# Patient Record
Sex: FEMALE | Race: WHITE | Employment: FULL TIME | URBAN - METROPOLITAN AREA
[De-identification: names, ages, dates, MRNs, and addresses within clinical notes are randomized per-mention and may not be internally consistent; named-entity substitution may affect disease eponyms.]

---

## 2020-11-08 ENCOUNTER — APPOINTMENT (OUTPATIENT)
Dept: CT IMAGING | Age: 40
End: 2020-11-08
Payer: COMMERCIAL

## 2020-11-08 ENCOUNTER — APPOINTMENT (OUTPATIENT)
Dept: GENERAL RADIOLOGY | Age: 40
End: 2020-11-08
Payer: COMMERCIAL

## 2020-11-08 ENCOUNTER — HOSPITAL ENCOUNTER (EMERGENCY)
Age: 40
Discharge: HOME | End: 2020-11-08
Attending: EMERGENCY MEDICINE
Payer: COMMERCIAL

## 2020-11-08 VITALS
WEIGHT: 140 LBS | OXYGEN SATURATION: 96 % | HEART RATE: 94 BPM | DIASTOLIC BLOOD PRESSURE: 75 MMHG | TEMPERATURE: 98.4 F | RESPIRATION RATE: 22 BRPM | HEIGHT: 64 IN | BODY MASS INDEX: 23.9 KG/M2 | SYSTOLIC BLOOD PRESSURE: 117 MMHG

## 2020-11-08 LAB
ABSOLUTE EOS #: 0.24 K/UL (ref 0–0.4)
ABSOLUTE IMMATURE GRANULOCYTE: 0 K/UL (ref 0–0.3)
ABSOLUTE LYMPH #: 4.01 K/UL (ref 1–4.8)
ABSOLUTE MONO #: 1.18 K/UL (ref 0.2–0.8)
ALBUMIN SERPL-MCNC: 4.1 G/DL (ref 3.5–5.2)
ALBUMIN/GLOBULIN RATIO: ABNORMAL (ref 1–2.5)
ALP BLD-CCNC: 75 U/L (ref 35–104)
ALT SERPL-CCNC: 11 U/L (ref 5–33)
ANION GAP SERPL CALCULATED.3IONS-SCNC: 14 MMOL/L (ref 9–17)
AST SERPL-CCNC: 14 U/L
BASOPHILS # BLD: 0 %
BASOPHILS ABSOLUTE: 0 K/UL (ref 0–0.2)
BILIRUB SERPL-MCNC: 0.15 MG/DL (ref 0.3–1.2)
BNP INTERPRETATION: NORMAL
BUN BLDV-MCNC: 7 MG/DL (ref 6–20)
BUN/CREAT BLD: 12 (ref 9–20)
CALCIUM SERPL-MCNC: 8.8 MG/DL (ref 8.6–10.4)
CHLORIDE BLD-SCNC: 108 MMOL/L (ref 98–107)
CO2: 19 MMOL/L (ref 20–31)
CREAT SERPL-MCNC: 0.58 MG/DL (ref 0.5–0.9)
D-DIMER QUANTITATIVE: 0.7 MG/L FEU (ref 0–0.59)
DIFFERENTIAL TYPE: ABNORMAL
EOSINOPHILS RELATIVE PERCENT: 2 % (ref 1–4)
GFR AFRICAN AMERICAN: >60 ML/MIN
GFR NON-AFRICAN AMERICAN: >60 ML/MIN
GFR SERPL CREATININE-BSD FRML MDRD: ABNORMAL ML/MIN/{1.73_M2}
GFR SERPL CREATININE-BSD FRML MDRD: ABNORMAL ML/MIN/{1.73_M2}
GLUCOSE BLD-MCNC: 119 MG/DL (ref 70–99)
HCT VFR BLD CALC: 42.3 % (ref 36.3–47.1)
HEMOGLOBIN: 13.8 G/DL (ref 11.9–15.1)
IMMATURE GRANULOCYTES: 0 %
LYMPHOCYTES # BLD: 34 % (ref 24–44)
MCH RBC QN AUTO: 30.5 PG (ref 25.2–33.5)
MCHC RBC AUTO-ENTMCNC: 32.6 G/DL (ref 28.4–34.8)
MCV RBC AUTO: 93.4 FL (ref 82.6–102.9)
MONOCYTES # BLD: 10 % (ref 1–7)
MYOGLOBIN: <21 NG/ML (ref 25–58)
MYOGLOBIN: <21 NG/ML (ref 25–58)
NRBC AUTOMATED: 0 PER 100 WBC
PDW BLD-RTO: 13.5 % (ref 11.8–14.4)
PLATELET # BLD: 198 K/UL (ref 138–453)
PLATELET ESTIMATE: ABNORMAL
PMV BLD AUTO: 9.3 FL (ref 8.1–13.5)
POTASSIUM SERPL-SCNC: 3.4 MMOL/L (ref 3.7–5.3)
PRO-BNP: 87 PG/ML
RBC # BLD: 4.53 M/UL (ref 3.95–5.11)
RBC # BLD: ABNORMAL 10*6/UL
SEG NEUTROPHILS: 54 % (ref 36–66)
SEGMENTED NEUTROPHILS ABSOLUTE COUNT: 6.37 K/UL (ref 1.8–7.7)
SODIUM BLD-SCNC: 141 MMOL/L (ref 135–144)
TOTAL PROTEIN: 6.7 G/DL (ref 6.4–8.3)
TROPONIN INTERP: ABNORMAL
TROPONIN INTERP: ABNORMAL
TROPONIN T: ABNORMAL NG/ML
TROPONIN T: ABNORMAL NG/ML
TROPONIN, HIGH SENSITIVITY: <6 NG/L (ref 0–14)
TROPONIN, HIGH SENSITIVITY: <6 NG/L (ref 0–14)
WBC # BLD: 11.8 K/UL (ref 3.5–11.3)
WBC # BLD: ABNORMAL 10*3/UL

## 2020-11-08 PROCEDURE — 71260 CT THORAX DX C+: CPT

## 2020-11-08 PROCEDURE — 85025 COMPLETE CBC W/AUTO DIFF WBC: CPT

## 2020-11-08 PROCEDURE — 2580000003 HC RX 258: Performed by: PHYSICIAN ASSISTANT

## 2020-11-08 PROCEDURE — 96374 THER/PROPH/DIAG INJ IV PUSH: CPT

## 2020-11-08 PROCEDURE — 80053 COMPREHEN METABOLIC PANEL: CPT

## 2020-11-08 PROCEDURE — 6360000004 HC RX CONTRAST MEDICATION: Performed by: PHYSICIAN ASSISTANT

## 2020-11-08 PROCEDURE — 6370000000 HC RX 637 (ALT 250 FOR IP): Performed by: PHYSICIAN ASSISTANT

## 2020-11-08 PROCEDURE — 83880 ASSAY OF NATRIURETIC PEPTIDE: CPT

## 2020-11-08 PROCEDURE — 83874 ASSAY OF MYOGLOBIN: CPT

## 2020-11-08 PROCEDURE — 6370000000 HC RX 637 (ALT 250 FOR IP): Performed by: EMERGENCY MEDICINE

## 2020-11-08 PROCEDURE — 71045 X-RAY EXAM CHEST 1 VIEW: CPT

## 2020-11-08 PROCEDURE — 99284 EMERGENCY DEPT VISIT MOD MDM: CPT

## 2020-11-08 PROCEDURE — 84484 ASSAY OF TROPONIN QUANT: CPT

## 2020-11-08 PROCEDURE — 6360000002 HC RX W HCPCS: Performed by: EMERGENCY MEDICINE

## 2020-11-08 PROCEDURE — 85379 FIBRIN DEGRADATION QUANT: CPT

## 2020-11-08 PROCEDURE — 93005 ELECTROCARDIOGRAM TRACING: CPT | Performed by: PHYSICIAN ASSISTANT

## 2020-11-08 RX ORDER — ASPIRIN 81 MG/1
324 TABLET, CHEWABLE ORAL ONCE
Status: COMPLETED | OUTPATIENT
Start: 2020-11-08 | End: 2020-11-08

## 2020-11-08 RX ORDER — POTASSIUM CHLORIDE 20 MEQ/1
40 TABLET, EXTENDED RELEASE ORAL ONCE
Status: COMPLETED | OUTPATIENT
Start: 2020-11-08 | End: 2020-11-08

## 2020-11-08 RX ORDER — 0.9 % SODIUM CHLORIDE 0.9 %
80 INTRAVENOUS SOLUTION INTRAVENOUS ONCE
Status: COMPLETED | OUTPATIENT
Start: 2020-11-08 | End: 2020-11-08

## 2020-11-08 RX ORDER — ONDANSETRON 2 MG/ML
4 INJECTION INTRAMUSCULAR; INTRAVENOUS ONCE
Status: COMPLETED | OUTPATIENT
Start: 2020-11-08 | End: 2020-11-08

## 2020-11-08 RX ORDER — SODIUM CHLORIDE 0.9 % (FLUSH) 0.9 %
10 SYRINGE (ML) INJECTION PRN
Status: DISCONTINUED | OUTPATIENT
Start: 2020-11-08 | End: 2020-11-09 | Stop reason: HOSPADM

## 2020-11-08 RX ADMIN — POTASSIUM CHLORIDE 40 MEQ: 20 TABLET, EXTENDED RELEASE ORAL at 22:20

## 2020-11-08 RX ADMIN — ONDANSETRON 4 MG: 2 INJECTION INTRAMUSCULAR; INTRAVENOUS at 21:36

## 2020-11-08 RX ADMIN — ASPIRIN 324 MG: 81 TABLET, CHEWABLE ORAL at 19:54

## 2020-11-08 RX ADMIN — Medication 10 ML: at 21:03

## 2020-11-08 RX ADMIN — IOPAMIDOL 75 ML: 755 INJECTION, SOLUTION INTRAVENOUS at 21:03

## 2020-11-08 RX ADMIN — SODIUM CHLORIDE 80 ML: 9 INJECTION, SOLUTION INTRAVENOUS at 21:03

## 2020-11-08 ASSESSMENT — PAIN DESCRIPTION - DESCRIPTORS: DESCRIPTORS: PRESSURE;CONSTANT

## 2020-11-08 ASSESSMENT — PAIN SCALES - GENERAL: PAINLEVEL_OUTOF10: 10

## 2020-11-08 ASSESSMENT — PAIN DESCRIPTION - LOCATION: LOCATION: CHEST

## 2020-11-08 ASSESSMENT — PAIN DESCRIPTION - PAIN TYPE: TYPE: ACUTE PAIN

## 2020-11-08 ASSESSMENT — PAIN DESCRIPTION - ORIENTATION: ORIENTATION: LEFT

## 2020-11-08 NOTE — LETTER
[facility] Emergency Department      [address] [place] [address], [address] [place] [address] [phone]            PROOF OF PRESENCE      To Whom It May Concern:    *** was present in the Emergency Department at [facility] on ***.                                      Sincerely,        ***

## 2020-11-09 ENCOUNTER — OFFICE VISIT (OUTPATIENT)
Dept: FAMILY MEDICINE CLINIC | Age: 40
End: 2020-11-09
Payer: COMMERCIAL

## 2020-11-09 ENCOUNTER — HOSPITAL ENCOUNTER (OUTPATIENT)
Age: 40
Setting detail: SPECIMEN
Discharge: HOME | End: 2020-11-09
Payer: COMMERCIAL

## 2020-11-09 VITALS — BODY MASS INDEX: 22.2 KG/M2 | WEIGHT: 130 LBS | HEIGHT: 64 IN

## 2020-11-09 LAB
INFLUENZA A ANTIBODY: NORMAL
INFLUENZA B ANTIBODY: NORMAL

## 2020-11-09 PROCEDURE — 99202 OFFICE O/P NEW SF 15 MIN: CPT | Performed by: NURSE PRACTITIONER

## 2020-11-09 PROCEDURE — 87804 INFLUENZA ASSAY W/OPTIC: CPT | Performed by: NURSE PRACTITIONER

## 2020-11-09 RX ORDER — ONDANSETRON 4 MG/1
4 TABLET, FILM COATED ORAL 3 TIMES DAILY PRN
Qty: 15 TABLET | Refills: 0 | Status: SHIPPED | OUTPATIENT
Start: 2020-11-09 | End: 2021-04-20

## 2020-11-09 RX ORDER — BENZONATATE 200 MG/1
200 CAPSULE ORAL 3 TIMES DAILY PRN
Qty: 30 CAPSULE | Refills: 0 | Status: SHIPPED | OUTPATIENT
Start: 2020-11-09 | End: 2020-11-16

## 2020-11-09 ASSESSMENT — ENCOUNTER SYMPTOMS
COUGH: 1
VOMITING: 1
SHORTNESS OF BREATH: 0
ABDOMINAL PAIN: 0
SINUS PAIN: 0
DIARRHEA: 0
SORE THROAT: 1
NAUSEA: 1

## 2020-11-09 NOTE — PATIENT INSTRUCTIONS
Advance Care Planning  People with COVID-19 may have no symptoms, mild symptoms, such as fever, cough, and shortness of breath or they may have more severe illness, developing severe and fatal pneumonia. As a result, Advance Care Planning with attention to naming a health care decision maker (someone you trust to make healthcare decisions for you if you could not speak for yourself) and sharing other health care preferences is important BEFORE a possible health crisis. Please contact your Primary Care Provider to discuss Advance Care Planning. Preventing the Spread of Coronavirus Disease 2019 in Homes and Residential Communities  For the most recent information go to [email or web address]    Prevention steps for People with confirmed or suspected COVID-19 (including persons under investigation) who do not need to be hospitalized  and   People with confirmed COVID-19 who were hospitalized and determined to be medically stable to go home    Your healthcare provider and public health staff will evaluate whether you can be cared for at home. If it is determined that you do not need to be hospitalized and can be isolated at home, you will be monitored by staff from your local or state health department. You should follow the prevention steps below until a healthcare provider or local or state health department says you can return to your normal activities. Stay home except to get medical care  People who are mildly ill with COVID-19 are able to isolate at home during their illness. You should restrict activities outside your home, except for getting medical care. Do not go to work, school, or public areas. Avoid using public transportation, ride-sharing, or taxis. Separate yourself from other people and animals in your home  People: As much as possible, you should stay in a specific room and away from other people in your home.  Also, you should use a separate before eating or preparing food. If soap and water are not readily available, use an alcohol-based hand  with at least 60% alcohol, covering all surfaces of your hands and rubbing them together until they feel dry. Soap and water are the best option if hands are visibly dirty. Avoid touching your eyes, nose, and mouth with unwashed hands. Avoid sharing personal household items  You should not share dishes, drinking glasses, cups, eating utensils, towels, or bedding with other people or pets in your home. After using these items, they should be washed thoroughly with soap and water. Clean all high-touch surfaces everyday  High touch surfaces include counters, tabletops, doorknobs, bathroom fixtures, toilets, phones, keyboards, tablets, and bedside tables. Also, clean any surfaces that may have blood, stool, or body fluids on them. Use a household cleaning spray or wipe, according to the label instructions. Labels contain instructions for safe and effective use of the cleaning product including precautions you should take when applying the product, such as wearing gloves and making sure you have good ventilation during use of the product. Monitor your symptoms  Seek prompt medical attention if your illness is worsening (e.g., difficulty breathing). Before seeking care, call your healthcare provider and tell them that you have, or are being evaluated for, COVID-19. Put on a facemask before you enter the facility. These steps will help the healthcare providers office to keep other people in the office or waiting room from getting infected or exposed. Ask your healthcare provider to call the local or state health department. Persons who are placed under active monitoring or facilitated self-monitoring should follow instructions provided by their local health department or occupational health professionals, as appropriate. When working with your local health department check their available hours.   If you have a medical emergency and need to call 911, notify the dispatch personnel that you have, or are being evaluated for COVID-19. If possible, put on a facemask before emergency medical services arrive. Discontinuing home isolation  Patients with confirmed COVID-19 should remain under home isolation precautions until the risk of secondary transmission to others is thought to be low. The decision to discontinue home isolation precautions should be made on a case-by-case basis, in consultation with healthcare providers and state and local health departments.

## 2020-11-09 NOTE — ED PROVIDER NOTES
[facility] ED  [facility]      Pt Name: Garrett Wetzel  MRN: [id number]  Robbygfbenjamin 1980  Date ofevaluation: 11/8/2020  Provider: Buddy Gabriel Dr       Chief Complaint   Patient presents with    Chest Pain     Onset 30 minutes pta    Shortness of Breath     Ongoing for 1 week         HISTORY OF PRESENT ILLNESS  (Location/Symptom, Timing/Onset, Context/Setting, Quality, Duration, Modifying Factors, Severity.)   Garrett Wetzel is a 36 y.o. female who presents to the emergency department with chest pain off and on for the last week. No fevers or chills. No nausea vomiting. No definite alleviating aggravating factors. No other complaints. Nursing Notes were reviewed. ALLERGIES     Patient has no known allergies. CURRENT MEDICATIONS       Discharge Medication List as of 11/8/2020 10:25 PM      CONTINUE these medications which have NOT CHANGED    Details   !! methylPREDNISolone (MEDROL DOSEPACK) 4 MG tablet Take by mouth as directed., Disp-21 tablet, R-0      DULoxetine (CYMBALTA) 30 MG capsule Take 1 capsule by mouth daily. , Disp-30 capsule, R-3      !! methylPREDNISolone (MEDROL DOSEPACK) 4 MG tablet Take by mouth as directed., Disp-21 tablet, R-0      etodolac (LODINE) 500 MG tablet Take 1 tablet by mouth 2 times daily. , Disp-60 tablet, R-2       !! - Potential duplicate medications found. Please discuss with provider. PAST MEDICAL HISTORY   History reviewed. No pertinent past medical history. SURGICAL HISTORY           Procedure Laterality Date    HAND TENDON SURGERY      HERNIA REPAIR      KIDNEY STONE SURGERY           FAMILY HISTORY     History reviewed. No pertinent family history. No family status information on file. SOCIAL HISTORY      reports that she has been smoking. She has been smoking about 0.50 packs per day. She has never used smokeless tobacco. She reports previous alcohol use.  She reports previous drug use.    REVIEW OFSYSTEMS    (2-9 systems for level 4, 10 or more for level 5)   Review of Systems    Except as noted above the remainder of the review of systems was reviewed and negative. PHYSICAL EXAM    (up to 7 for level 4, 8 or more for level 5)     ED Triage Vitals [11/08/20 1946]   BP Temp Temp Source Pulse Resp SpO2 Height Weight   121/76 98.4 °F (36.9 °C) Oral 119 18 96 % 5' 4\" (1.626 m) 140 lb (63.5 kg)      Physical Exam  Constitutional:       Appearance: She is well-developed. HENT:      Head: Normocephalic and atraumatic. Neck:      Musculoskeletal: Normal range of motion and neck supple. Cardiovascular:      Rate and Rhythm: Normal rate and regular rhythm. Pulmonary:      Effort: Pulmonary effort is normal.      Breath sounds: Normal breath sounds. Abdominal:      Palpations: Abdomen is soft. Musculoskeletal: Normal range of motion. Skin:     General: Skin is warm. Findings: No rash. Neurological:      Mental Status: She is alert and oriented to person, place, and time.    Psychiatric:         Behavior: Behavior normal.                 DIAGNOSTIC RESULTS     EKG: All EKG's are interpreted by the Emergency Department Physician who either signs or Co-signs this chart in the absence of a cardiologist.        RADIOLOGY:   Non-plain film images such as CT, Ultrasound and MRI are read by the radiologist. Plain radiographic images arevisualized and preliminarily interpreted by the emergency physician with the below findings:        Interpretation per the Radiologist below, if available at thetime of this note:          ED BEDSIDE ULTRASOUND:   Performed by ED Physician - none    LABS:  Labs Reviewed   CBC WITH AUTO DIFFERENTIAL - Abnormal; Notable for the following components:       Result Value    WBC 11.8 (*)     Monocytes 10 (*)     Absolute Mono # 1.18 (*)     All other components within normal limits   TROP/MYOGLOBIN - Abnormal; Notable for the following components:    Myoglobin <21 (*)     All other components within normal limits   TROP/MYOGLOBIN - Abnormal; Notable for the following components:    Myoglobin <21 (*)     All other components within normal limits   COMPREHENSIVE METABOLIC PANEL - Abnormal; Notable for the following components:    Glucose 119 (*)     Potassium 3.4 (*)     Chloride 108 (*)     CO2 19 (*)     Total Bilirubin 0.15 (*)     All other components within normal limits   D-DIMER, QUANTITATIVE - Abnormal; Notable for the following components:    D-Dimer, Quant 0.70 (*)     All other components within normal limits   BRAIN NATRIURETIC PEPTIDE   TROP/MYOGLOBIN       All other labs were within normal range or not returned as of this dictation. EMERGENCY DEPARTMENT COURSE and DIFFERENTIAL DIAGNOSIS/MDM:   Vitals:    Vitals:    11/08/20 2136 11/08/20 2150 11/08/20 2207 11/08/20 2220   BP: 108/73 108/81 92/62 117/75   Pulse: 105 95 84 94   Resp: 19  16 22   Temp:       TempSrc:       SpO2: 90% 95% 93% 96%   Weight:       Height:       D-dimer is elevated so therefore CT scan of chest was done to rule out PE.  2 sets of cardiac enzymes were negative. Patient will be discharged home. CONSULTS:  None    PROCEDURES:  Procedures        FINAL IMPRESSION      1. Chest pain, unspecified type          DISPOSITION/PLAN   DISPOSITION Decision To Discharge 11/08/2020 10:10:36 PM      PATIENTREFERRED TO:   No follow-up provider specified.     DISCHARGE MEDICATIONS:     Discharge Medication List as of 11/8/2020 10:25 PM              (Please note that portions of this note were completed with a voice recognition program.  Efforts were made to edit thedictations but occasionally words are mis-transcribed.)    ISMAEL Lopez PA-C  11/08/20 [id number]

## 2020-11-09 NOTE — LETTER
[address] [place] [address]  [place] [address]  Phone: [phone]  Fax: [phone]    KHLOE Toro CNP        November 9, 2020     Patient: Ayse Camacho   YOB: 1980   Date of Visit: 11/9/2020       To Whom It May Concern: It is my medical opinion that Ayse Camacho Is excused pending COVID results. If you have any questions or concerns, please don't hesitate to call.     Sincerely,        KHLOE Toro CNP

## 2020-11-10 LAB
EKG ATRIAL RATE: 101 BPM
EKG P AXIS: 65 DEGREES
EKG P-R INTERVAL: 144 MS
EKG Q-T INTERVAL: 342 MS
EKG QRS DURATION: 92 MS
EKG QTC CALCULATION (BAZETT): 443 MS
EKG R AXIS: 69 DEGREES
EKG T AXIS: 57 DEGREES
EKG VENTRICULAR RATE: 101 BPM
SARS-COV-2, NAA: NOT DETECTED

## 2020-11-10 NOTE — PROGRESS NOTES
for this visit. No Known Allergies    Subjective:      Review of Systems   Constitutional: Negative for chills and fever. HENT: Positive for congestion and sore throat. Negative for ear pain and sinus pain. Respiratory: Positive for cough. Negative for shortness of breath. Cardiovascular: Positive for chest pain. Negative for palpitations. Gastrointestinal: Positive for nausea and vomiting. Negative for abdominal pain and diarrhea. Musculoskeletal: Positive for myalgias. Neurological: Positive for headaches. Negative for dizziness. All other systems reviewed and are negative. Objective:     Physical Exam  Vitals signs and nursing note reviewed. Constitutional:       General: She is not in acute distress. Appearance: Normal appearance. HENT:      Nose: Congestion present. Mouth/Throat:      Mouth: Mucous membranes are moist.      Pharynx: No posterior oropharyngeal erythema. Cardiovascular:      Rate and Rhythm: Normal rate. Pulmonary:      Effort: Pulmonary effort is normal. No respiratory distress. Breath sounds: Normal breath sounds. Skin:     General: Skin is warm and dry. Neurological:      General: No focal deficit present. Mental Status: She is alert and oriented to person, place, and time.        Ht 5' 4\" (1.626 m)   Wt 130 lb (59 kg)   BMI 22.31 kg/m²   Lab Review   Admission on 11/08/2020, Discharged on 11/08/2020   Component Date Value    WBC 11/08/2020 11.8*    RBC 11/08/2020 4.53     Hemoglobin 11/08/2020 13.8     Hematocrit 11/08/2020 42.3     MCV 11/08/2020 93.4     MCH 11/08/2020 30.5     MCHC 11/08/2020 32.6     RDW 11/08/2020 13.5     Platelets 73/60/1979 198     MPV 11/08/2020 9.3     NRBC Automated 11/08/2020 0.0     Differential Type 11/08/2020 NOT REPORTED     WBC Morphology 11/08/2020 NOT REPORTED     RBC Morphology 11/08/2020 NOT REPORTED     Platelet Estimate 97/02/8094 NOT REPORTED     Seg Neutrophils 11/08/2020 54  Lymphocytes 11/08/2020 34     Monocytes 11/08/2020 10*    Eosinophils % 11/08/2020 2     Basophils 11/08/2020 0     Immature Granulocytes 11/08/2020 0     Segs Absolute 11/08/2020 6.37     Absolute Lymph # 11/08/2020 4.01     Absolute Mono # 11/08/2020 1.18*    Absolute Eos # 11/08/2020 0.24     Basophils Absolute 11/08/2020 0.00     Absolute Immature Granul* 11/08/2020 0.00     Ventricular Rate 11/08/2020 101     Atrial Rate 11/08/2020 101     P-R Interval 11/08/2020 144     QRS Duration 11/08/2020 92     Q-T Interval 11/08/2020 342     QTc Calculation (Bazett) 11/08/2020 443     P Axis 11/08/2020 65     R Axis 11/08/2020 69     T Axis 11/08/2020 57     Troponin, High Sensitivi* 11/08/2020 <6     Troponin T 11/08/2020 NOT REPORTED     Troponin Interp 11/08/2020 NOT REPORTED     Myoglobin 11/08/2020 <21*    Troponin, High Sensitivi* 11/08/2020 <6     Troponin T 11/08/2020 NOT REPORTED     Troponin Interp 11/08/2020 NOT REPORTED     Myoglobin 11/08/2020 <21*    Pro-BNP 11/08/2020 87     BNP Interpretation 11/08/2020 Pro-BNP Reference Range:     Glucose 11/08/2020 119*    BUN 11/08/2020 7     CREATININE 11/08/2020 0.58     Bun/Cre Ratio 11/08/2020 12     Calcium 11/08/2020 8.8     Sodium 11/08/2020 141     Potassium 11/08/2020 3.4*    Chloride 11/08/2020 108*    CO2 11/08/2020 19*    Anion Gap 11/08/2020 14     Alkaline Phosphatase 11/08/2020 75     ALT 11/08/2020 11     AST 11/08/2020 14     Total Bilirubin 11/08/2020 0.15*    Total Protein 11/08/2020 6.7     Alb 11/08/2020 4.1     Albumin/Globulin Ratio 11/08/2020 NOT REPORTED     GFR Non- 11/08/2020 >60     GFR  11/08/2020 >60     GFR Comment 11/08/2020          GFR Staging 11/08/2020 NOT REPORTED     D-Dimer, Quant 11/08/2020 0.70*       Assessment:       Diagnosis Orders   1.  Nausea and vomiting, intractability of vomiting not specified, unspecified vomiting type  benzonatate (TESSALON) 200 MG capsule    ondansetron (ZOFRAN) 4 MG tablet    POCT Influenza A/B    COVID-19 Ambulatory   2. Cough  benzonatate (TESSALON) 200 MG capsule    ondansetron (ZOFRAN) 4 MG tablet    POCT Influenza A/B    COVID-19 Ambulatory       Plan:      Return if symptoms worsen or fail to improve. Orders Placed This Encounter   Medications    benzonatate (TESSALON) 200 MG capsule     Sig: Take 1 capsule by mouth 3 times daily as needed for Cough     Dispense:  30 capsule     Refill:  0    ondansetron (ZOFRAN) 4 MG tablet     Sig: Take 1 tablet by mouth 3 times daily as needed for Nausea or Vomiting     Dispense:  15 tablet     Refill:  0       Results for orders placed or performed in visit on 11/09/20   POCT Influenza A/B   Result Value Ref Range    Influenza A Ab NEG     Influenza B Ab NEG      Recommend isolation pending covid results. Discussed treatment regimen to include rest, hydration, tylenol prn. Discussed deep breathing exercises. Discussed to monitor for progression of symptoms. Follow up as needed. Will contact patient for results       Patient given educational materials - see patient instructions. Discussed use, benefit, and side effects of prescribed medications. All patientquestions answered. Pt voiced understanding. This note was transcribed using dictation with Dragon services. Efforts were made to correct any errors but some words may be misinterpreted.      Electronically signed by KHLOE Nixon CNP on 11/9/2020at 10:14 PM

## 2021-04-20 ENCOUNTER — OFFICE VISIT (OUTPATIENT)
Dept: ORTHOPEDIC SURGERY | Age: 41
End: 2021-04-20
Payer: COMMERCIAL

## 2021-04-20 VITALS
BODY MASS INDEX: 22.2 KG/M2 | HEIGHT: 64 IN | WEIGHT: 130 LBS | HEART RATE: 85 BPM | DIASTOLIC BLOOD PRESSURE: 79 MMHG | SYSTOLIC BLOOD PRESSURE: 116 MMHG

## 2021-04-20 DIAGNOSIS — M75.101 ROTATOR CUFF SYNDROME, RIGHT: Primary | ICD-10-CM

## 2021-04-20 DIAGNOSIS — M77.11 RIGHT LATERAL EPICONDYLITIS: ICD-10-CM

## 2021-04-20 PROCEDURE — 99204 OFFICE O/P NEW MOD 45 MIN: CPT | Performed by: FAMILY MEDICINE

## 2021-04-20 NOTE — PROGRESS NOTES
Sports Medicine Consultation     CHIEF COMPLAINT:  Shoulder Pain (Rt shoulder. almost 1yr. no injury) and Elbow Injury (Rt elbow. almost 1yr no injury)      HPI:  Melissa Peña is a 39y.o. year old female who is a new patient being seen for regarding new problem right elbow pain. The pain has been present for 1 year(s). The patient recalls a no specific injury. The patient has tried counterforce strap with improvement. The pain is described as achy. The elbow does not swell. There is is not painful popping and clicking. The elbow does not catch or lock. new problem right. The patient is a right hand dominant female who has had shoulder pain for years. As far as trauma to the shoulder, the patient indicates none, pain came up from elbow. The pain is  worse at night and when doing overhead activities. Weakness of the shoulder has  been noted. The pain restricts activities such as anything that might be heavy. The pain does not seem to improve with time. The following medications have been tried: motrin with benefit. Physical Therapy has not been tried. Corticosteroid injection has not been done. Neck pain has not been present. she has no past medical history on file. she has a past surgical history that includes Kidney stone surgery; Hand tendon surgery; and hernia repair. family history is not on file.     Social History     Socioeconomic History    Marital status: Legally      Spouse name: Not on file    Number of children: Not on file    Years of education: Not on file    Highest education level: Not on file   Occupational History    Not on file   Social Needs    Financial resource strain: Not on file    Food insecurity     Worry: Not on file     Inability: Not on file    Transportation needs     Medical: Not on file     Non-medical: Not on file   Tobacco Use    Smoking status: Current Every Day Smoker     Packs/day: 0.50    Smokeless tobacco: Never Used Substance and Sexual Activity    Alcohol use: Not Currently    Drug use: Not Currently    Sexual activity: Not on file   Lifestyle    Physical activity     Days per week: Not on file     Minutes per session: Not on file    Stress: Not on file   Relationships    Social connections     Talks on phone: Not on file     Gets together: Not on file     Attends [employer] service: Not on file     Active member of club or organization: Not on file     Attends meetings of clubs or organizations: Not on file     Relationship status: Not on file    Intimate partner violence     Fear of current or ex partner: Not on file     Emotionally abused: Not on file     Physically abused: Not on file     Forced sexual activity: Not on file   Other Topics Concern    Not on file   Social History Narrative    Not on file       Current Outpatient Medications   Medication Sig Dispense Refill    diclofenac sodium (VOLTAREN) 1 % GEL Apply 2 g topically 4 times daily as needed for Pain 100 g 3    methylPREDNISolone (MEDROL DOSEPACK) 4 MG tablet Take by mouth as directed. (Patient not taking: Reported on 11/9/2020) 21 tablet 0     No current facility-administered medications for this visit. Allergies:  shehas No Known Allergies. ROS:  CV:  Denies chest pain; palpitations; shortness of breath; swelling of feet, ankles; and loss of consciousness. CON: Denies fever and dizziness. ENT:  Denies hearing loss / ringing, ear infections hoarseness, and swallowing problems. RESP:  Denies chronic cough, spitting up blood, and asthma/wheezing. GI: Denies abdominal pain, change in bowel habits, nausea or vomiting, and blood in stools. :  Denies frequent urination, burning or painful urination, blood in the urine, and bladder incontinence. NEURO:  Denies headache, memory loss, sleep disturbance, and tremor or movement disorder.     PHYSICAL EXAM:   /79 (Site: Left Upper Arm)   Pulse 85   Ht 5' 4\" (1.626 m)   Wt 130 lb (59 kg)   BMI 22.31 kg/m²   GENERAL: Jose Lal is a 39 y.o. female who is alert and oriented and sitting comfortably in our office. SKIN:  Intact without rashes, lesions or ulcerations. NEURO: Sensation to the extremity is intact. VASC:  Capillary refill is less than 3 seconds. Distal pulses are palpable. There is no lymphadenopathy. Cervical spine ROM WNL  Spurlings: negative,     MSK:  Forward elevation 180 degrees, external rotation in neutral 90 degrees, abduction 180 degrees, internal rotation to T6. Supraspinatus 5/5   External rotators 5/5  Internal 5/5  Full Can negative   Empty Can negative   Neer's test positive   Rodriguez-Mundo test. negative. Pain with cross body adduction negative. Anterior Labral Stress test positive. Speed's test negative   Trenton's test negative. Pain over anterolateral acromion negative. Subscap liftoff negative. Belly press test negative. Pain over AC joint negative. Pain over traps/rhomboids negative. Elbow Exam:  Musculoskeletal/Neurologic:  Inspection-Deformity: no  Palpation-Tenderness: lat epicondyle  Elbow ROM-WNL  Pain with passive wrist flexion: no  Pain with passive wrist extension: yes  Pain with active wrist flexion: no  Pain with active wrist extension: yes  Strength- WNL  Sensation-normal to light touch in median, ulnar, and radial distribution  Special Tests-No varus/valgus laxity  Milking Maneuver negative  Franklin-Dionicio negative    Tinel's at cubital tunnel:negative    Sensation-normal to light touch    PSYCH:  Good fund of knowledge and displays understanding of exam.    RADIOLOGY: No results found. Radiology:  3 views of the Right shoulder were ordered, independently visualized by me, and discussed with patient. Findings:  The radiographs failed to demonstrate significant osseous abnormalities or degenerative changes of the acromioclavicular joint and a mild nature without any acute fractures or dislocations    Assessment no acute processes of the right shoulder    IMPRESSION:     1. Rotator cuff syndrome, right    2. Right lateral epicondylitis          PLAN:   We discussed some of the etiologies and natural histories of     ICD-10-CM    1. Rotator cuff syndrome, right  M75. 101 XR SHOULDER RIGHT (MIN 2 VIEWS)     External Referral To Physical Therapy   2. Right lateral epicondylitis  M77.11 External Referral To Physical Therapy   . We discussed the various treatment alternatives including anti-inflammatory medications, physical therapy, injections, further imaging studies and as a last resort surgery. Think the patient would benefit from a course of formal physical therapy working on her elbow and shoulder we will set her up for the course of physical therapy and see her back otherwise as needed if she fails to improve we will consider further interventions however I think therapy is the right answer for the patient she voiced understanding agreement satisfaction with this plan    Return to clinic in No follow-ups on file. Marcellus Rios     Please be aware portions of this note were completed using voice recognition software and unforeseen errors may have occurred    Electronically signed by Zohra Azul DO, FAOASM on 4/20/21 at 1:54 PM EDT

## 2021-04-22 DIAGNOSIS — M75.101 ROTATOR CUFF SYNDROME, RIGHT: ICD-10-CM

## 2021-04-22 DIAGNOSIS — M77.11 RIGHT LATERAL EPICONDYLITIS: Primary | ICD-10-CM

## 2021-05-17 ENCOUNTER — OFFICE VISIT (OUTPATIENT)
Dept: ORTHOPEDIC SURGERY | Age: 41
End: 2021-05-17
Payer: COMMERCIAL

## 2021-05-17 VITALS — HEIGHT: 64 IN | BODY MASS INDEX: 22.2 KG/M2 | WEIGHT: 130 LBS

## 2021-05-17 DIAGNOSIS — M77.11 RIGHT LATERAL EPICONDYLITIS: ICD-10-CM

## 2021-05-17 DIAGNOSIS — M75.101 ROTATOR CUFF SYNDROME, RIGHT: Primary | ICD-10-CM

## 2021-05-17 PROCEDURE — 99214 OFFICE O/P EST MOD 30 MIN: CPT | Performed by: FAMILY MEDICINE

## 2021-05-17 RX ORDER — METHYLPREDNISOLONE 4 MG/1
TABLET ORAL
Qty: 1 KIT | Refills: 0 | Status: SHIPPED | OUTPATIENT
Start: 2021-05-17 | End: 2021-05-23

## 2021-05-17 NOTE — PROGRESS NOTES
Sports Medicine Consultation    CHIEF COMPLAINT:  Elbow Pain (Right ) and Shoulder Pain (Right )        HPI:   The patient is a 39 y.o. female who is being seen as a  established patient being seen for regarding follow up of a pre-existing problem r shoulder and elbow. The patient is a right hand dominant female who has had shoulder pain for years. As far as trauma to the shoulder, the patient indicates no new. The pain is  worse at night and when doing overhead activities. Weakness of the shoulder has  been noted. The pain restricts activities such as all. The pain does not seem to improve with time. The following medications have been tried: PT with benefit. Physical Therapy has been tried. Corticosteroid injection has not been done. Neck pain has been present. she has no past medical history on file. she has a past surgical history that includes Kidney stone surgery; Hand tendon surgery; and hernia repair. History reviewed. No pertinent past medical history. Past Surgical History:   Procedure Laterality Date    HAND TENDON SURGERY      HERNIA REPAIR      KIDNEY STONE SURGERY         family history is not on file.     Social History     Socioeconomic History    Marital status: Legally      Spouse name: Not on file    Number of children: Not on file    Years of education: Not on file    Highest education level: Not on file   Occupational History    Not on file   Tobacco Use    Smoking status: Current Every Day Smoker     Packs/day: 0.50    Smokeless tobacco: Never Used   Vaping Use    Vaping Use: Never used   Substance and Sexual Activity    Alcohol use: Not Currently    Drug use: Not Currently    Sexual activity: Not on file   Other Topics Concern    Not on file   Social History Narrative    Not on file     Social Determinants of Health     Financial Resource Strain:     Difficulty of Paying Living Expenses:    Food Insecurity:     Worried About Running Out of Food in the Last Year:    [address] in the Last Year:    Transportation Needs:     Lack of Transportation (Medical):  Lack of Transportation (Non-Medical):    Physical Activity:     Days of Exercise per Week:     Minutes of Exercise per Session:    Stress:     Feeling of Stress :    Social Connections:     Frequency of Communication with Friends and Family:     Frequency of Social Gatherings with Friends and Family:     Attends Jew Services:     Active Member of Clubs or Organizations:     Attends Club or Organization Meetings:     Marital Status:    Intimate Partner Violence:     Fear of Current or Ex-Partner:     Emotionally Abused:     Physically Abused:     Sexually Abused:        Current Outpatient Medications   Medication Sig Dispense Refill    methylPREDNISolone (MEDROL DOSEPACK) 4 MG tablet Take by mouth. 1 kit 0    diclofenac sodium (VOLTAREN) 1 % GEL Apply 2 g topically 4 times daily as needed for Pain 100 g 3    methylPREDNISolone (MEDROL DOSEPACK) 4 MG tablet Take by mouth as directed. 21 tablet 0     No current facility-administered medications for this visit. Allergies:  shehas No Known Allergies. ROS:  CV:  Denies chest pain; palpitations; shortness of breath; swelling of feet, ankles; and loss of consciousness. CON: Denies fever and dizziness. ENT:  Denies hearing loss / ringing, ear infections hoarseness, and swallowing problems. RESP:  Denies chronic cough, spitting up blood, and asthma/wheezing. GI: Denies abdominal pain, change in bowel habits, nausea or vomiting, and blood in stools. :  Denies frequent urination, burning or painful urination, blood in the urine, and bladder incontinence. NEURO:  Denies headache, memory loss, sleep disturbance, and tremor or movement disorder.     PHYSICAL EXAM:   Ht 5' 4\" (1.626 m)   Wt 130 lb (59 kg)   BMI 22.31 kg/m²   GENERAL: Amy Guardado is a 39 y.o. female who is alert and oriented and sitting comfortably in our office. SKIN:  Intact without rashes, lesions or ulcerations. No obvious deformity or swelling. NEURO: Musculoskeletal and axillary nerves intact to sensory and motor testing. EYES:  Extraocular muscles intact. MOUTH: Oral mucosa moist.  No perioral lesions. PULM:  Respirations unlabored and regular. VASC:  Capillary refill less than 3 seconds. Cervical spine ROM Reduced resulting in globally  Spurlings: negative,     MSK:  Forward elevation 160 degrees, external rotation in neutral 80 degrees, abduction 160 degrees, internal rotation to T12. Supraspinatus 5/5   External rotators 5/5  Internal 5/5  Full Can negative   Empty Can negative   Neer's test positive   Rodriguez-Mundo test. positive. Pain with cross body adduction negative. Anterior Labral Stress test positive. Speed's test negative   [place]'s test negative. Pain over anterolateral acromion negative. Subscap liftoff positive. Belly press test negative. Pain over AC joint negative. Pain over traps/rhomboids positive. Elbow Exam:  Musculoskeletal/Neurologic:  Inspection-Deformity: no  Palpation-Tenderness: condyles  Elbow ROM-WNL  Pain with passive wrist flexion: no  Pain with passive wrist extension: no  Pain with active wrist flexion: no  Pain with active wrist extension: no  Strength- WNL  Sensation-normal to light touch in median, ulnar, and radial distribution  Special Tests-No varus/valgus laxity  Milking Maneuver negative  Franklin-Haw negative    Tinel's at cubital tunnel:negative    PSYCH:  Patient has good fund of knowledge and displays understanding of exam.    RADIOLOGY: No results found. IMPRESSION:     1. Rotator cuff syndrome, right    2. Right lateral epicondylitis        PLAN:   We discussed some of the etiologies and natural histories of     ICD-10-CM    1. Rotator cuff syndrome, right  M75.101    2.  Right lateral epicondylitis  M77.11      We discussed the various treatment alternatives including anti-inflammatory medications, physical therapy, injections, further imaging studies and as a last resort surgery. At this point patient reports that she is in pain but she has not done anything from a pain relief standpoint she was unable to get anti-inflammatories and is currently doing formal physical therapy we will make sure that she picks up the topical anti-inflammatories we will give her Medrol Dosepak we will see her back based on the progression she will call me    Return to clinic No follow-ups on file. Negar Hicks     Please be aware portions of this note were completed using voice recognition software and unforeseen errors may have occurred    Electronically signed by Morenita Ambrosio DO, FAOASM on 5/17/21 at 10:04 AM EDT Non-Graft Cartilage Fenestration Text: The cartilage was fenestrated with a 2mm punch biopsy to help facilitate healing.

## 2021-06-02 ENCOUNTER — OFFICE VISIT (OUTPATIENT)
Dept: FAMILY MEDICINE CLINIC | Age: 41
End: 2021-06-02
Payer: COMMERCIAL

## 2021-06-02 VITALS
WEIGHT: 142.8 LBS | HEART RATE: 85 BPM | DIASTOLIC BLOOD PRESSURE: 60 MMHG | OXYGEN SATURATION: 98 % | HEIGHT: 64 IN | TEMPERATURE: 97.9 F | SYSTOLIC BLOOD PRESSURE: 102 MMHG | BODY MASS INDEX: 24.38 KG/M2

## 2021-06-02 DIAGNOSIS — F17.200 TOBACCO DEPENDENCE: ICD-10-CM

## 2021-06-02 DIAGNOSIS — Z00.00 ROUTINE GENERAL MEDICAL EXAMINATION AT A HEALTH CARE FACILITY: ICD-10-CM

## 2021-06-02 DIAGNOSIS — Z13.220 SCREENING, LIPID: ICD-10-CM

## 2021-06-02 DIAGNOSIS — Z76.89 ESTABLISHING CARE WITH NEW DOCTOR, ENCOUNTER FOR: ICD-10-CM

## 2021-06-02 DIAGNOSIS — Z13.1 DIABETES MELLITUS SCREENING: ICD-10-CM

## 2021-06-02 DIAGNOSIS — Z11.59 ENCOUNTER FOR HEPATITIS C SCREENING TEST FOR LOW RISK PATIENT: ICD-10-CM

## 2021-06-02 DIAGNOSIS — M19.011 ARTHROPATHY OF RIGHT SHOULDER: ICD-10-CM

## 2021-06-02 DIAGNOSIS — Z23 NEED FOR PNEUMOCOCCAL VACCINATION: ICD-10-CM

## 2021-06-02 DIAGNOSIS — J01.00 ACUTE NON-RECURRENT MAXILLARY SINUSITIS: Primary | ICD-10-CM

## 2021-06-02 DIAGNOSIS — Z11.4 SCREENING FOR HIV (HUMAN IMMUNODEFICIENCY VIRUS): ICD-10-CM

## 2021-06-02 PROCEDURE — 99406 BEHAV CHNG SMOKING 3-10 MIN: CPT | Performed by: NURSE PRACTITIONER

## 2021-06-02 PROCEDURE — 90471 IMMUNIZATION ADMIN: CPT | Performed by: NURSE PRACTITIONER

## 2021-06-02 PROCEDURE — 99203 OFFICE O/P NEW LOW 30 MIN: CPT | Performed by: NURSE PRACTITIONER

## 2021-06-02 PROCEDURE — 90732 PPSV23 VACC 2 YRS+ SUBQ/IM: CPT | Performed by: NURSE PRACTITIONER

## 2021-06-02 RX ORDER — VARENICLINE TARTRATE
KIT
Qty: 1 BOX | Refills: 0 | Status: ON HOLD | OUTPATIENT
Start: 2021-06-02 | End: 2022-06-23 | Stop reason: ALTCHOICE

## 2021-06-02 RX ORDER — AMOXICILLIN 875 MG/1
875 TABLET, COATED ORAL 2 TIMES DAILY
Qty: 20 TABLET | Refills: 0 | Status: SHIPPED | OUTPATIENT
Start: 2021-06-02 | End: 2021-06-12

## 2021-06-02 RX ORDER — VARENICLINE TARTRATE 1 MG/1
1 TABLET, FILM COATED ORAL 2 TIMES DAILY
Qty: 60 TABLET | Refills: 5 | Status: ON HOLD | OUTPATIENT
Start: 2021-06-02 | End: 2022-06-23 | Stop reason: ALTCHOICE

## 2021-06-02 ASSESSMENT — ENCOUNTER SYMPTOMS
SORE THROAT: 1
ABDOMINAL PAIN: 0
WHEEZING: 0
RHINORRHEA: 1
SINUS PAIN: 1
VOMITING: 0
NAUSEA: 0
COUGH: 1
SHORTNESS OF BREATH: 0
CHEST TIGHTNESS: 0
SINUS PRESSURE: 1

## 2021-06-02 ASSESSMENT — PATIENT HEALTH QUESTIONNAIRE - PHQ9
SUM OF ALL RESPONSES TO PHQ QUESTIONS 1-9: 0
2. FEELING DOWN, DEPRESSED OR HOPELESS: 0
SUM OF ALL RESPONSES TO PHQ9 QUESTIONS 1 & 2: 0
1. LITTLE INTEREST OR PLEASURE IN DOING THINGS: 0
SUM OF ALL RESPONSES TO PHQ QUESTIONS 1-9: 0
SUM OF ALL RESPONSES TO PHQ QUESTIONS 1-9: 0

## 2021-06-02 NOTE — PROGRESS NOTES
Visit Information    Have you changed or started any medications since your last visit including any over-the-counter medicines, vitamins, or herbal medicines? no   Have you stopped taking any of your medications? Is so, why? -  no  Are you having any side effects from any of your medications? - no    Have you seen any other physician or provider since your last visit?  no   Have you had any other diagnostic tests since your last visit?  no   Have you been seen in the emergency room and/or had an admission in a hospital since we last saw you?  no   Have you had your routine dental cleaning in the past 6 months?  no     Do you have an active MyChart account? If no, what is the barrier? No: na    Patient Care Team:  KHLOE Valencia CNP as PCP - General (Family Nurse Practitioner)    Medical History Review  Past Medical, Family, and Social History reviewed and  contribute to the patient presenting condition    Health Maintenance   Topic Date Due    Hepatitis C screen  Never done    Varicella vaccine (1 of 2 - 2-dose childhood series) Never done    Pneumococcal 0-64 years Vaccine (1 of 2 - PPSV23) Never done    COVID-19 Vaccine (1) Never done    HIV screen  Never done    DTaP/Tdap/Td vaccine (1 - Tdap) Never done    Cervical cancer screen  Never done    Lipid screen  Never done    Flu vaccine (Season Ended) 09/01/2021    Hepatitis A vaccine  Aged Out    Hepatitis B vaccine  Aged Out    Hib vaccine  Aged Out    Meningococcal (ACWY) vaccine  Aged Out       After obtaining consent, and per orders of Jenniffer Samuel CNP, injection of Pneumo 23 given in Right deltoid by Eliz Bowman MA. Patient instructed to remain in clinic for 20 minutes afterwards, and to report any adverse reaction to me immediately.

## 2021-06-02 NOTE — PROGRESS NOTES
Use Topics    Alcohol use: Yes     Comment: occasional      Current Outpatient Medications   Medication Sig Dispense Refill    varenicline (CHANTIX CONTINUING MONTH PAK) 1 MG tablet Take 1 tablet by mouth 2 times daily 60 tablet 5    varenicline (CHANTIX STARTING MONTH JOVANNY) 0.5 MG X 11 & 1 MG X 42 tablet Take by mouth. 1 box 0    amoxicillin (AMOXIL) 875 MG tablet Take 1 tablet by mouth 2 times daily for 10 days 20 tablet 0     No current facility-administered medications for this visit. No Known Allergies    Health Maintenance   Topic Date Due    Hepatitis C screen  Never done    Varicella vaccine (1 of 2 - 2-dose childhood series) Never done    COVID-19 Vaccine (1) Never done    HIV screen  Never done    Cervical cancer screen  Never done    Lipid screen  Never done    Flu vaccine (Season Ended) 09/01/2021    DTaP/Tdap/Td vaccine (2 - Td or Tdap) 06/02/2023    Pneumococcal 0-64 years Vaccine (2 of 2) 01/01/2045    Hepatitis A vaccine  Aged Out    Hepatitis B vaccine  Aged Out    Hib vaccine  Aged Out    Meningococcal (ACWY) vaccine  Aged Out       Subjective:      Review of Systems   Constitutional: Negative for activity change, appetite change, chills, diaphoresis, fatigue and fever. HENT: Positive for congestion, postnasal drip, rhinorrhea, sinus pressure, sinus pain, sneezing and sore throat. Eyes: Negative for visual disturbance. Respiratory: Positive for cough. Negative for chest tightness, shortness of breath and wheezing. Cardiovascular: Negative for chest pain, palpitations and leg swelling. Gastrointestinal: Negative for abdominal pain, nausea and vomiting. Genitourinary: Negative for difficulty urinating. Musculoskeletal: Positive for arthralgias (right shoulder). Negative for gait problem, joint swelling and myalgias. Skin: Negative for rash. Allergic/Immunologic: Negative for immunocompromised state.    Neurological: Negative for dizziness, weakness and headaches. Hematological: Negative for adenopathy. Psychiatric/Behavioral: Negative for dysphoric mood and sleep disturbance. The patient is not nervous/anxious. Objective:      Physical Exam  Vitals and nursing note reviewed. Constitutional:       General: She is not in acute distress. Appearance: Normal appearance. She is well-developed. She is not ill-appearing or diaphoretic. Comments: /60 (Site: Left Upper Arm, Position: Sitting, Cuff Size: Medium Adult)   Pulse 85   Temp 97.9 °F (36.6 °C) (Tympanic)   Ht 5' 4\" (1.626 m)   Wt 142 lb 12.8 oz (64.8 kg)   LMP 06/02/2020   SpO2 98%   BMI 24.51 kg/m²      HENT:      Head: Normocephalic and atraumatic. Right Ear: External ear normal.      Left Ear: External ear normal.      Nose: Congestion and rhinorrhea present. Right Sinus: Maxillary sinus tenderness present. Left Sinus: Maxillary sinus tenderness present. Mouth/Throat:      Mouth: Mucous membranes are moist.   Eyes:      Conjunctiva/sclera: Conjunctivae normal.   Cardiovascular:      Rate and Rhythm: Normal rate and regular rhythm. Heart sounds: Normal heart sounds. Comments: No LE edema  Pulmonary:      Effort: Pulmonary effort is normal.      Breath sounds: Normal breath sounds. Musculoskeletal:      Cervical back: Normal range of motion and neck supple. Lymphadenopathy:      Head:      Right side of head: No submental, submandibular or tonsillar adenopathy. Left side of head: No submental, submandibular or tonsillar adenopathy. Cervical: No cervical adenopathy. Upper Body:      Right upper body: No supraclavicular adenopathy. Left upper body: No supraclavicular adenopathy. Skin:     General: Skin is warm and dry. Neurological:      General: No focal deficit present. Mental Status: She is alert and oriented to person, place, and time. Mental status is at baseline.    Psychiatric:         Mood and Affect: Mood normal.         Behavior: Behavior normal.         Thought Content: Thought content normal.         Judgment: Judgment normal.         Assessment:       Diagnosis Orders   1. Acute non-recurrent maxillary sinusitis  amoxicillin (AMOXIL) 875 MG tablet   2. Tobacco dependence  [facility] Medication Mgmt (Smoking Cessation - Clinical Pharmacy) - [facility] TOBACCO USE CESSATION INTERMEDIATE 3-10 MINUTES    varenicline (CHANTIX CONTINUING MONTH JOVANNY) 1 MG tablet    varenicline (CHANTIX STARTING MONTH JOVANNY) 0.5 MG X 11 & 1 MG X 42 tablet    Pneumococcal polysaccharide vaccine 23-valent greater than or equal to 1yo subcutaneous/IM   3. Arthropathy of right shoulder  [facility], [facility], , Orthopedic Surgery, [facility]   4. Need for pneumococcal vaccination  Pneumococcal polysaccharide vaccine 23-valent greater than or equal to 1yo subcutaneous/IM   5. Routine general medical examination at a health care facility  Lipid Panel    CBC With Auto Differential    Comprehensive Metabolic Panel    Hemoglobin A1C   6. Screening, lipid  Lipid Panel   7. Diabetes mellitus screening  Hemoglobin A1C   8. Screening for HIV (human immunodeficiency virus)  HIV Screen   9. Encounter for hepatitis C screening test for low risk patient  Hepatitis C Antibody   10. Establishing care with new doctor, encounter for         Plan:      Return if symptoms worsen or fail to improve.   Orders Placed This Encounter   Procedures    Pneumococcal polysaccharide vaccine 23-valent greater than or equal to 1yo subcutaneous/IM    Lipid Panel     Standing Status:   Future     Standing Expiration Date:   6/2/2022     Order Specific Question:   Is Patient Fasting?/# of Hours     Answer:   8 hrs    CBC With Auto Differential     Standing Status:   Future     Standing Expiration Date:   6/2/2022    Comprehensive Metabolic Panel     Standing Status:   Future     Standing Expiration Date:   6/2/2022    Hemoglobin A1C     Standing Status:   Future see patient instructions. Discussed use,benefit, and side effects of prescribed medications. All patient questions answered. Pt voiced understanding. Reviewed health maintenance. Instructed to continue currentmedications, diet and exercise.     Electronically signed by KHLOE Jefferson CNP, CNP on 6/2/2021 at 4:02 PM

## 2021-06-03 ENCOUNTER — TELEPHONE (OUTPATIENT)
Dept: PHARMACY | Age: 41
End: 2021-06-03

## 2021-06-03 NOTE — TELEPHONE ENCOUNTER
Received new referral for Smoking Cessation from [facility]. Called patient and left voicemail to schedule initial appointment.

## 2021-06-04 ENCOUNTER — HOSPITAL ENCOUNTER (OUTPATIENT)
Age: 41
Setting detail: SPECIMEN
Discharge: HOME | End: 2021-06-04
Payer: COMMERCIAL

## 2021-06-04 DIAGNOSIS — Z13.220 SCREENING, LIPID: ICD-10-CM

## 2021-06-04 DIAGNOSIS — Z00.00 ROUTINE GENERAL MEDICAL EXAMINATION AT A HEALTH CARE FACILITY: ICD-10-CM

## 2021-06-04 DIAGNOSIS — Z11.59 ENCOUNTER FOR HEPATITIS C SCREENING TEST FOR LOW RISK PATIENT: ICD-10-CM

## 2021-06-04 DIAGNOSIS — Z13.1 DIABETES MELLITUS SCREENING: ICD-10-CM

## 2021-06-04 DIAGNOSIS — Z11.4 SCREENING FOR HIV (HUMAN IMMUNODEFICIENCY VIRUS): ICD-10-CM

## 2021-06-04 LAB
ABSOLUTE EOS #: 0.31 K/UL (ref 0–0.44)
ABSOLUTE IMMATURE GRANULOCYTE: <0.03 K/UL (ref 0–0.3)
ABSOLUTE LYMPH #: 1.89 K/UL (ref 1.1–3.7)
ABSOLUTE MONO #: 0.5 K/UL (ref 0.1–1.2)
ALBUMIN SERPL-MCNC: 4 G/DL (ref 3.5–5.2)
ALBUMIN/GLOBULIN RATIO: 1.4 (ref 1–2.5)
ALP BLD-CCNC: 89 U/L (ref 35–104)
ALT SERPL-CCNC: 15 U/L (ref 5–33)
ANION GAP SERPL CALCULATED.3IONS-SCNC: 11 MMOL/L (ref 9–17)
AST SERPL-CCNC: 15 U/L
BASOPHILS # BLD: 0 % (ref 0–2)
BASOPHILS ABSOLUTE: <0.03 K/UL (ref 0–0.2)
BILIRUB SERPL-MCNC: 0.28 MG/DL (ref 0.3–1.2)
BUN BLDV-MCNC: 7 MG/DL (ref 6–20)
BUN/CREAT BLD: ABNORMAL (ref 9–20)
CALCIUM SERPL-MCNC: 9 MG/DL (ref 8.6–10.4)
CHLORIDE BLD-SCNC: 105 MMOL/L (ref 98–107)
CHOLESTEROL/HDL RATIO: 2.9
CHOLESTEROL: 174 MG/DL
CO2: 22 MMOL/L (ref 20–31)
CREAT SERPL-MCNC: 0.49 MG/DL (ref 0.5–0.9)
DIFFERENTIAL TYPE: NORMAL
EOSINOPHILS RELATIVE PERCENT: 4 % (ref 1–4)
ESTIMATED AVERAGE GLUCOSE: 123 MG/DL
GFR AFRICAN AMERICAN: >60 ML/MIN
GFR NON-AFRICAN AMERICAN: >60 ML/MIN
GFR SERPL CREATININE-BSD FRML MDRD: ABNORMAL ML/MIN/{1.73_M2}
GFR SERPL CREATININE-BSD FRML MDRD: ABNORMAL ML/MIN/{1.73_M2}
GLUCOSE BLD-MCNC: 94 MG/DL (ref 70–99)
HBA1C MFR BLD: 5.9 % (ref 4–6)
HCT VFR BLD CALC: 44.7 % (ref 36.3–47.1)
HDLC SERPL-MCNC: 60 MG/DL
HEMOGLOBIN: 14.2 G/DL (ref 11.9–15.1)
HEPATITIS C ANTIBODY: NONREACTIVE
HIV AG/AB: NONREACTIVE
IMMATURE GRANULOCYTES: 0 %
LDL CHOLESTEROL: 98 MG/DL (ref 0–130)
LYMPHOCYTES # BLD: 25 % (ref 24–43)
MCH RBC QN AUTO: 29.5 PG (ref 25.2–33.5)
MCHC RBC AUTO-ENTMCNC: 31.8 G/DL (ref 28.4–34.8)
MCV RBC AUTO: 92.9 FL (ref 82.6–102.9)
MONOCYTES # BLD: 7 % (ref 3–12)
NRBC AUTOMATED: 0 PER 100 WBC
PDW BLD-RTO: 13.7 % (ref 11.8–14.4)
PLATELET # BLD: 187 K/UL (ref 138–453)
PLATELET ESTIMATE: NORMAL
PMV BLD AUTO: 10 FL (ref 8.1–13.5)
POTASSIUM SERPL-SCNC: 4.2 MMOL/L (ref 3.7–5.3)
RBC # BLD: 4.81 M/UL (ref 3.95–5.11)
RBC # BLD: NORMAL 10*6/UL
SEG NEUTROPHILS: 64 % (ref 36–65)
SEGMENTED NEUTROPHILS ABSOLUTE COUNT: 4.72 K/UL (ref 1.5–8.1)
SODIUM BLD-SCNC: 138 MMOL/L (ref 135–144)
TOTAL PROTEIN: 6.8 G/DL (ref 6.4–8.3)
TRIGL SERPL-MCNC: 82 MG/DL
VLDLC SERPL CALC-MCNC: NORMAL MG/DL (ref 1–30)
WBC # BLD: 7.5 K/UL (ref 3.5–11.3)
WBC # BLD: NORMAL 10*3/UL

## 2021-06-09 ENCOUNTER — TELEPHONE (OUTPATIENT)
Dept: FAMILY MEDICINE CLINIC | Age: 41
End: 2021-06-09

## 2021-06-09 NOTE — TELEPHONE ENCOUNTER
Patient called back. Her previous name was Rosemary Living but she got  and so now it is currently [facility].

## 2021-06-09 NOTE — TELEPHONE ENCOUNTER
[facility] faxed a pap smear report to our office after it was requested for patient and the report faxed to us as same first name and  but different last name. Cannot find any where in chart of another last name [place] Goes).       Left message on voicemail to call our office back

## 2021-06-17 NOTE — TELEPHONE ENCOUNTER
Spoke with patient, she is currently unable to schedule an appointment but will call back later in the day when she is available

## 2021-07-14 ENCOUNTER — OFFICE VISIT (OUTPATIENT)
Dept: ORTHOPEDIC SURGERY | Age: 41
End: 2021-07-14
Payer: COMMERCIAL

## 2021-07-14 VITALS — WEIGHT: 142 LBS | BODY MASS INDEX: 24.24 KG/M2 | HEIGHT: 64 IN

## 2021-07-14 DIAGNOSIS — M77.11 RIGHT TENNIS ELBOW: Primary | ICD-10-CM

## 2021-07-14 DIAGNOSIS — M75.81 RIGHT ROTATOR CUFF TENDINITIS: ICD-10-CM

## 2021-07-14 PROCEDURE — 99213 OFFICE O/P EST LOW 20 MIN: CPT | Performed by: ORTHOPAEDIC SURGERY

## 2021-07-14 PROCEDURE — 20610 DRAIN/INJ JOINT/BURSA W/O US: CPT | Performed by: ORTHOPAEDIC SURGERY

## 2021-07-14 NOTE — PROGRESS NOTES
[facility]  [address] [facility]  [address] [address]  Dept: [phone]    Orthopaedic New Patient    CHIEF COMPLAINT:    Chief Complaint   Patient presents with    Shoulder Pain     right     HISTORY OF PRESENT ILLNESS:    The patient is a 39 y.o. female who is being seen as a new patient for right shoulder pain. Patient reports that the right shoulder pain began approximately 1 year ago. Of note, patient is a  and performs extensive overhead activities and lifts heavy things. She states that the right shoulder pain has been tolerable until recently where the pain exacerbated with work. Patient fortunately has been on a 1 week layoff thus far significant relief from the sustained rest.  Patient is also had a Medrol Dosepak with moderate relief prescribed by primary care physician last month. Patient is also tried physical therapy but has only performed a few sessions due to some documenting issues with her past office. The pain is described as achy along the lateral aspect of the shoulder and also sometimes associated with burning along the trapezius muscle. Patient states that the pain is gotten so bad to the point that she has lost some strength but now has regained it since her last physical therapy sessions. Denies numbness/tingling. Otherwise, patient has no orthopedic complaints.       Past Medical History:    Past Medical History:   Diagnosis Date    Shingles     age 21       Past Surgical History:    Past Surgical History:   Procedure Laterality Date    ENDOMETRIAL ABLATION  2020    HAND TENDON SURGERY      HERNIA REPAIR      x3    KIDNEY STONE SURGERY      POLYPECTOMY      colon 2015       Current Medications:   Current Outpatient Medications   Medication Sig Dispense Refill    varenicline (CHANTIX CONTINUING MONTH JOVANNY) 1 MG tablet Take 1 tablet by mouth 2 times daily 60 tablet 5    varenicline no cough or shortness of breath  GI: no nausea, vomiting, diarrhea, or constipation  Neuro: no numbness, tingling, or weakness  Msk: As described in the HPI  10 remaining systems reviewed and negative      PHYSICAL EXAM:  Ht 5' 4\" (1.626 m)   Wt 142 lb (64.4 kg)   BMI 24.37 kg/m² Body mass index is 24.37 kg/m². Physical Exam  Gen: alert and oriented, no acute distress  Psych: Appropriate affect; Appropriate knowledge base; Appropriate mood; No hallucinations  Head: normocephalic atraumatic   Chest: symmetric chest excursion  Ortho Exam  MSK: Right shoulder:  Skin is intact. Tenderness to palpation appreciated along the supraspinatus fossa, lateral deltoid, AC joint. Active range of motion shoulder flexion 140 degrees, abduction 120 degrees, external rotation 40 degrees internal rotation to iliac crest.  Jobes, Rodriguez, speeds test positive. Cross arm test positive. Spurling test negative. Negative Lhermitte sign. Sensations intact grossly about the shoulder. Motor function intact grossly about the shoulder and skin is warm and well-perfused. Radiology:   History: Right shoulder pain    Comparison: None    Findings: AP, scapular Y, axillary views of the right shoulder showing mild osteoarthritic changes noted to the [facility] joint. No acute osseous pathology seen. No radiopaque foreign bodies or masses are appreciated. Impression: Mild osteoarthritis of AC joint       ASSESSMENT:  39 y.o. female with mild right AC joint arthritis, rotator cuff tendinitis    PLAN:     Patient is here today for evaluation of the aforementioned pathology. Based upon history physical exam it appears patient has a rotator cuff tendinitis. I had a in-depth discussion regarding operative versus nonoperative treatment. At this time, we will trial a corticosteroid injection to the right hip with the prescription of physical therapy for her rotator cuff.   I recommended patient also take 800 mg ibuprofen up to 3 times a day every 8 hours. Patient is opted for the aforementioned treatment plan. Procedure note below. Patient may follow with us in 6 weeks for reevaluation. She was amenable to all recommendations and was encouraged to call the office with any questions. Injection procedure note  The alternatives, benefits, and risks were discussed with the patient. After answering all questions to the patient's satisfaction, the patient agreed to proceed forward with injection and gave verbal consent for the procedure. With the patient's permission, appropriate anatomic landmarks were identified and the right subacromial space was prepped in a sterile fashion using alcohol and/or betadine. A 21 gauge needle was then used to inject 2cc 0.25% marcaine plain and 80mg depo medrol into the bursa. The injection was advanced without resistance confirming appropriate position. The patient tolerated the procedure well and the site was dressed with a band-aid. Patient was advised to ice the area for 15-20 minutes to relieve any injection site related pain. Patient was advised to contact nurse if area becomes swollen, hot, erythematous, or painful, or to go to the emergency room after business hours. Return in about 6 weeks (around 8/25/2021). No orders of the defined types were placed in this encounter.       Orders Placed This Encounter   Procedures    External Referral To Physical Therapy     Referral Priority:   Routine     Referral Type:   Eval and Treat     Referral Reason:   Specialty Services Required     Requested Specialty:   Physical Therapy     Number of Visits Requested:   1        Electronically signed by Loyd Jackson DO on7/14/2021 at 12:20 PM

## 2021-07-15 RX ORDER — BUPIVACAINE HYDROCHLORIDE 2.5 MG/ML
2 INJECTION, SOLUTION INFILTRATION; PERINEURAL ONCE
Status: COMPLETED | OUTPATIENT
Start: 2021-07-15 | End: 2021-07-15

## 2021-07-15 RX ORDER — METHYLPREDNISOLONE ACETATE 80 MG/ML
80 INJECTION, SUSPENSION INTRA-ARTICULAR; INTRALESIONAL; INTRAMUSCULAR; SOFT TISSUE ONCE
Status: COMPLETED | OUTPATIENT
Start: 2021-07-15 | End: 2021-07-15

## 2021-07-15 RX ADMIN — METHYLPREDNISOLONE ACETATE 80 MG: 80 INJECTION, SUSPENSION INTRA-ARTICULAR; INTRALESIONAL; INTRAMUSCULAR; SOFT TISSUE at 09:07

## 2021-07-15 RX ADMIN — BUPIVACAINE HYDROCHLORIDE 5 MG: 2.5 INJECTION, SOLUTION INFILTRATION; PERINEURAL at 09:03

## 2021-08-08 ENCOUNTER — OFFICE VISIT (OUTPATIENT)
Dept: PRIMARY CARE CLINIC | Age: 41
End: 2021-08-08
Payer: COMMERCIAL

## 2021-08-08 VITALS
TEMPERATURE: 98.2 F | HEIGHT: 64 IN | HEART RATE: 84 BPM | WEIGHT: 142 LBS | DIASTOLIC BLOOD PRESSURE: 84 MMHG | BODY MASS INDEX: 24.24 KG/M2 | OXYGEN SATURATION: 98 % | SYSTOLIC BLOOD PRESSURE: 117 MMHG

## 2021-08-08 DIAGNOSIS — L02.01 FACIAL ABSCESS: Primary | ICD-10-CM

## 2021-08-08 PROCEDURE — 99213 OFFICE O/P EST LOW 20 MIN: CPT | Performed by: NURSE PRACTITIONER

## 2021-08-08 RX ORDER — DOXYCYCLINE HYCLATE 100 MG/1
100 CAPSULE ORAL 2 TIMES DAILY
Qty: 14 CAPSULE | Refills: 0 | Status: SHIPPED | OUTPATIENT
Start: 2021-08-08 | End: 2021-08-15

## 2021-08-08 ASSESSMENT — ENCOUNTER SYMPTOMS
CHEST TIGHTNESS: 0
COUGH: 0
WHEEZING: 0
SHORTNESS OF BREATH: 0
RESPIRATORY NEGATIVE: 1

## 2021-08-08 NOTE — PROGRESS NOTES
[id number]  [address] [place] [address] [address] [address]  Dept: [phone]  Dept Fax: [phone]     Agustin Sloan is a 39 y.o. female who presents to the urgent care today for her medicalconditions/complaints as noted below. Agustin Sloan is c/o of Facial Swelling (pt has small spot on her right cheek with swelling the spot is soft)    HPI:      Rash  This is a new problem. The current episode started yesterday. The problem has been rapidly worsening since onset. Location: right cheek. The rash is characterized by pain, redness and swelling. It is unknown (abrasion) if there was an exposure to a precipitant. Associated symptoms include facial edema. Pertinent negatives include no cough, fatigue, fever or shortness of breath. Past treatments include nothing. The treatment provided no relief. Past Medical History:   Diagnosis Date    Shingles     age 21      Current Outpatient Medications   Medication Sig Dispense Refill    doxycycline hyclate (VIBRAMYCIN) 100 MG capsule Take 1 capsule by mouth 2 times daily for 7 days 14 capsule 0    varenicline (CHANTIX CONTINUING MONTH PAK) 1 MG tablet Take 1 tablet by mouth 2 times daily 60 tablet 5    varenicline (CHANTIX STARTING MONTH PAK) 0.5 MG X 11 & 1 MG X 42 tablet Take by mouth. 1 box 0     No current facility-administered medications for this visit. No Known Allergies    Reviewed PMH, SH, and  with the patient and updated. Subjective:      Review of Systems   Constitutional: Negative for chills, fatigue and fever. Respiratory: Negative. Negative for cough, chest tightness, shortness of breath and wheezing. Cardiovascular: Negative. Negative for chest pain. Skin: Positive for rash. All other systems reviewed and are negative. Objective:      Physical Exam  Vitals and nursing note reviewed. Constitutional:       General: She is not in acute distress. Appearance: She is well-developed.  She is not diaphoretic. HENT:      Head: Normocephalic and atraumatic. Mouth/Throat:      Dentition: Normal dentition. Comments: Right sided facial swelling noted. Cardiovascular:      Rate and Rhythm: Normal rate and regular rhythm. Heart sounds: Normal heart sounds. No murmur heard. Pulmonary:      Effort: Pulmonary effort is normal. No respiratory distress. Breath sounds: Normal breath sounds. No wheezing. Skin:     General: Skin is warm and dry. Findings: Abrasion (abrasion noted to the right lower chin with surrounding swelling and induration) and rash present. Neurological:      Mental Status: She is alert. /84 (Site: Left Upper Arm, Position: Sitting, Cuff Size: Large Adult)   Pulse 84   Temp 98.2 °F (36.8 °C) (Tympanic)   Ht 5' 4\" (1.626 m)   Wt 142 lb (64.4 kg)   SpO2 98%   Breastfeeding No   BMI 24.37 kg/m²     Assessment:       Diagnosis Orders   1. Facial abscess  doxycycline hyclate (VIBRAMYCIN) 100 MG capsule     Plan:      Area is indurated and I am unable to palpate a defined abscess that can be drained in the office at this time. Patient instructed to complete antibiotic prescription fully. Warm compresses TID for 15 minutes at a time. Tylenol/Motrin as needed for the discomfort/fever. Patient agreeable to treatment plan. Educational materials provided on AVS.  Follow up in 2-3 days if symptoms have not improved or area has not started to drain and we will evaluated to see if I&D is appropriate at that time. .    Orders Placed This Encounter   Medications    doxycycline hyclate (VIBRAMYCIN) 100 MG capsule     Sig: Take 1 capsule by mouth 2 times daily for 7 days     Dispense:  14 capsule     Refill:  0        Patient given educational materials - see patient instructions. Discussed use, benefit, and side effects of prescribed medications. All patientquestions answered. Pt voiced understanding.     Electronically signed by KHLOE Del Valle - GOVIND on 8/8/2021at 10:57 AM

## 2021-08-08 NOTE — PATIENT INSTRUCTIONS
Patient Education        Skin Abscess: Care Instructions  Your Care Instructions     A skin abscess is a bacterial infection that forms a pocket of pus. A boil is a kind of skin abscess. The doctor may have cut an opening in the abscess so that the pus can drain out. You may have gauze in the cut so that the abscess will stay open and keep draining. You may need antibiotics. You will need to follow up with your doctor to make sure the infection has gone away. The doctor has checked you carefully, but problems can develop later. If you notice any problems or new symptoms, get medical treatment right away. Follow-up care is a key part of your treatment and safety. Be sure to make and go to all appointments, and call your doctor if you are having problems. It's also a good idea to know your test results and keep a list of the medicines you take. How can you care for yourself at home? · Apply warm and dry compresses, a heating pad set on low, or a hot water bottle 3 or 4 times a day for pain. Keep a cloth between the heat source and your skin. · If your doctor prescribed antibiotics, take them as directed. Do not stop taking them just because you feel better. You need to take the full course of antibiotics. · Take pain medicines exactly as directed. ? If the doctor gave you a prescription medicine for pain, take it as prescribed. ? If you are not taking a prescription pain medicine, ask your doctor if you can take an over-the-counter medicine. · Keep your bandage clean and dry. Change the bandage whenever it gets wet or dirty, or at least one time a day. · If the abscess was packed with gauze:  ? Keep follow-up appointments to have the gauze changed or removed. If the doctor instructed you to remove the gauze, follow the instructions you were given for how to remove it. ? After the gauze is removed, soak the area in warm water for 15 to 20 minutes 2 times a day, until the wound closes.   When should you call for help? Call your doctor now or seek immediate medical care if:    · You have signs of worsening infection, such as:  ? Increased pain, swelling, warmth, or redness. ? Red streaks leading from the infected skin. ? Pus draining from the wound. ? A fever. Watch closely for changes in your health, and be sure to contact your doctor if:    · You do not get better as expected. Where can you learn more? Go to https://[employer]peRenal Treatment Centerseweb.[employer]. org and sign in to your [employer] account. Enter D024 in the [employer] box to learn more about \"Skin Abscess: Care Instructions. \"     If you do not have an account, please click on the \"Sign Up Now\" link. Current as of: March 3, 2021               Content Version: 12.9  © 3099-2448 [employer]wise, Incorporated. Care instructions adapted under license by [facility] ([facility]). If you have questions about a medical condition or this instruction, always ask your healthcare professional. [place] [address] any warranty or liability for your use of this information.

## 2021-10-19 ENCOUNTER — NURSE TRIAGE (OUTPATIENT)
Dept: OTHER | Facility: CLINIC | Age: 41
End: 2021-10-19

## 2021-10-19 NOTE — TELEPHONE ENCOUNTER
Reason for Disposition   SEVERE abdominal pain (e.g., excruciating)    Answer Assessment - Initial Assessment Questions  1. LOCATION: \"Where does it hurt? \"           LLQ     2. RADIATION: \"Does the pain shoot anywhere else? \" (e.g., chest, back)          When the sharp pain comes it shoots to maybe kidney area on R & L side     3. ONSET: \"When did the pain begin? \" (e.g., minutes, hours or days ago)           6  days     4. SUDDEN: \"Gradual or sudden onset? \"           Gradual - started as a stomach ache and not feeling good and then became this     5. PATTERN \"Does the pain come and go, or is it constant? \"     - If constant: \"Is it getting better, staying the same, or worsening? \"       (Note: Constant means the pain never goes away completely; most serious pain is constant and it progresses)      - If intermittent: \"How long does it last?\" \"Do you have pain now? \"      (Note: Intermittent means the pain goes away completely between bouts)              Constant mild dull 2/10 and sharp intermittent 8/10    6. SEVERITY: \"How bad is the pain? \"  (e.g., Scale 1-10; mild, moderate, or severe)    - MILD (1-3): doesn't interfere with normal activities, abdomen soft and not tender to touch     - MODERATE (4-7): interferes with normal activities or awakens from sleep, tender to touch     - SEVERE (8-10): excruciating pain, doubled over, unable to do any normal activities            Pulsing and Sharp pain 8/10 when it kicks in and takes breath away            Constant annoyance     7. RECURRENT SYMPTOM: \"Have you ever had this type of abdominal pain before? \" If so, ask: \"When was the last time? \" and \"What happened that time? \"             Years ago pt had problems with stool and had polyps      8. CAUSE: \"What do you think is causing the abdominal pain? \"            Unsure     9. RELIEVING/AGGRAVATING FACTORS: \"What makes it better or worse? \" (e.g., movement, antacids, bowel movement)           Lay with heating pad helps some Movement aggravates it     10. OTHER SYMPTOMS: \"Has there been any vomiting, diarrhea, constipation, or urine problems? \"           Diarrhea x8 in 24 hrs for 6 days, if touching side cant feel anything but it feels like something is there, denies urinary symptoms, nausea, denies vomiting    11. PREGNANCY: \"Is there any chance you are pregnant? \" \"When was your last menstrual period? \"            Denies    Protocols used: ABDOMINAL PAIN - FEMALE-ADULT-OH    Received call  with Red Flag Complaint. Brief description of triage: see above     Triage indicates for patient to go to ED now for severe abd pain for 6 days with severe diarrhea. Care advice provided, patient verbalizes understanding; denies any other questions or concerns; instructed to call back for any new or worsening symptoms. Attention Provider: Thank you for allowing me to participate in the care of your patient. The patient was connected to triage in response to information provided to the ECC/PSC. Please do not respond through this encounter as the response is not directed to a shared pool.

## 2022-06-23 ENCOUNTER — APPOINTMENT (OUTPATIENT)
Dept: GENERAL RADIOLOGY | Age: 42
DRG: 395 | End: 2022-06-23
Payer: COMMERCIAL

## 2022-06-23 ENCOUNTER — HOSPITAL ENCOUNTER (INPATIENT)
Age: 42
LOS: 1 days | Discharge: HOME | DRG: 395 | End: 2022-06-24
Attending: EMERGENCY MEDICINE | Admitting: INTERNAL MEDICINE
Payer: COMMERCIAL

## 2022-06-23 DIAGNOSIS — T18.9XXA SWALLOWED FOREIGN BODY, INITIAL ENCOUNTER: Primary | ICD-10-CM

## 2022-06-23 LAB
ABSOLUTE EOS #: 0.28 K/UL (ref 0–0.4)
ABSOLUTE IMMATURE GRANULOCYTE: 0 K/UL (ref 0–0.3)
ABSOLUTE LYMPH #: 3.5 K/UL (ref 1–4.8)
ABSOLUTE MONO #: 0.46 K/UL (ref 0.2–0.8)
ANION GAP SERPL CALCULATED.3IONS-SCNC: 10 MMOL/L (ref 9–17)
BASOPHILS # BLD: 0 %
BASOPHILS ABSOLUTE: 0 K/UL (ref 0–0.2)
BUN BLDV-MCNC: 7 MG/DL (ref 6–20)
BUN/CREAT BLD: 12 (ref 9–20)
CALCIUM SERPL-MCNC: 8.8 MG/DL (ref 8.6–10.4)
CHLORIDE BLD-SCNC: 105 MMOL/L (ref 98–107)
CO2: 23 MMOL/L (ref 20–31)
CREAT SERPL-MCNC: 0.58 MG/DL (ref 0.5–0.9)
EOSINOPHILS RELATIVE PERCENT: 3 % (ref 1–4)
GFR AFRICAN AMERICAN: >60 ML/MIN
GFR NON-AFRICAN AMERICAN: >60 ML/MIN
GFR SERPL CREATININE-BSD FRML MDRD: NORMAL ML/MIN/{1.73_M2}
GLUCOSE BLD-MCNC: 97 MG/DL (ref 70–99)
HCT VFR BLD CALC: 41.7 % (ref 36.3–47.1)
HEMOGLOBIN: 13.3 G/DL (ref 11.9–15.1)
IMMATURE GRANULOCYTES: 0 %
LYMPHOCYTES # BLD: 38 % (ref 24–44)
MCH RBC QN AUTO: 29.8 PG (ref 25.2–33.5)
MCHC RBC AUTO-ENTMCNC: 31.9 G/DL (ref 28.4–34.8)
MCV RBC AUTO: 93.5 FL (ref 82.6–102.9)
MONOCYTES # BLD: 5 % (ref 1–7)
NRBC AUTOMATED: 0 PER 100 WBC
PDW BLD-RTO: 13.9 % (ref 11.8–14.4)
PLATELET # BLD: 199 K/UL (ref 138–453)
PMV BLD AUTO: 9.6 FL (ref 8.1–13.5)
POTASSIUM SERPL-SCNC: 4.1 MMOL/L (ref 3.7–5.3)
RBC # BLD: 4.46 M/UL (ref 3.95–5.11)
SEG NEUTROPHILS: 54 % (ref 36–66)
SEGMENTED NEUTROPHILS ABSOLUTE COUNT: 4.96 K/UL (ref 1.8–7.7)
SODIUM BLD-SCNC: 138 MMOL/L (ref 135–144)
WBC # BLD: 9.2 K/UL (ref 3.5–11.3)

## 2022-06-23 PROCEDURE — 80048 BASIC METABOLIC PNL TOTAL CA: CPT

## 2022-06-23 PROCEDURE — 99285 EMERGENCY DEPT VISIT HI MDM: CPT

## 2022-06-23 PROCEDURE — 99254 IP/OBS CNSLTJ NEW/EST MOD 60: CPT | Performed by: INTERNAL MEDICINE

## 2022-06-23 PROCEDURE — 74018 RADEX ABDOMEN 1 VIEW: CPT

## 2022-06-23 PROCEDURE — G0378 HOSPITAL OBSERVATION PER HR: HCPCS

## 2022-06-23 PROCEDURE — 96361 HYDRATE IV INFUSION ADD-ON: CPT

## 2022-06-23 PROCEDURE — 85025 COMPLETE CBC W/AUTO DIFF WBC: CPT

## 2022-06-23 PROCEDURE — 1200000000 HC SEMI PRIVATE

## 2022-06-23 PROCEDURE — 99222 1ST HOSP IP/OBS MODERATE 55: CPT | Performed by: PHYSICIAN ASSISTANT

## 2022-06-23 PROCEDURE — 2500000003 HC RX 250 WO HCPCS: Performed by: NURSE PRACTITIONER

## 2022-06-23 PROCEDURE — 6370000000 HC RX 637 (ALT 250 FOR IP): Performed by: STUDENT IN AN ORGANIZED HEALTH CARE EDUCATION/TRAINING PROGRAM

## 2022-06-23 PROCEDURE — 96360 HYDRATION IV INFUSION INIT: CPT

## 2022-06-23 PROCEDURE — APPNB30 APP NON BILLABLE TIME 0-30 MINS: Performed by: NURSE PRACTITIONER

## 2022-06-23 PROCEDURE — 71046 X-RAY EXAM CHEST 2 VIEWS: CPT

## 2022-06-23 RX ORDER — ENOXAPARIN SODIUM 100 MG/ML
40 INJECTION SUBCUTANEOUS DAILY
Status: DISCONTINUED | OUTPATIENT
Start: 2022-06-23 | End: 2022-06-24 | Stop reason: HOSPADM

## 2022-06-23 RX ORDER — MAGNESIUM SULFATE 1 G/100ML
1000 INJECTION INTRAVENOUS PRN
Status: DISCONTINUED | OUTPATIENT
Start: 2022-06-23 | End: 2022-06-24 | Stop reason: HOSPADM

## 2022-06-23 RX ORDER — SODIUM CHLORIDE 0.9 % (FLUSH) 0.9 %
10 SYRINGE (ML) INJECTION PRN
Status: DISCONTINUED | OUTPATIENT
Start: 2022-06-23 | End: 2022-06-24 | Stop reason: HOSPADM

## 2022-06-23 RX ORDER — POTASSIUM CHLORIDE 20 MEQ/1
40 TABLET, EXTENDED RELEASE ORAL PRN
Status: DISCONTINUED | OUTPATIENT
Start: 2022-06-23 | End: 2022-06-24 | Stop reason: HOSPADM

## 2022-06-23 RX ORDER — SODIUM CHLORIDE 9 MG/ML
INJECTION, SOLUTION INTRAVENOUS PRN
Status: DISCONTINUED | OUTPATIENT
Start: 2022-06-23 | End: 2022-06-24 | Stop reason: HOSPADM

## 2022-06-23 RX ORDER — DEXTROSE, SODIUM CHLORIDE, AND POTASSIUM CHLORIDE 5; .45; .15 G/100ML; G/100ML; G/100ML
INJECTION INTRAVENOUS CONTINUOUS
Status: DISCONTINUED | OUTPATIENT
Start: 2022-06-23 | End: 2022-06-24 | Stop reason: HOSPADM

## 2022-06-23 RX ORDER — ACETAMINOPHEN 650 MG/1
650 SUPPOSITORY RECTAL EVERY 6 HOURS PRN
Status: DISCONTINUED | OUTPATIENT
Start: 2022-06-23 | End: 2022-06-24 | Stop reason: HOSPADM

## 2022-06-23 RX ORDER — ONDANSETRON 2 MG/ML
4 INJECTION INTRAMUSCULAR; INTRAVENOUS EVERY 6 HOURS PRN
Status: DISCONTINUED | OUTPATIENT
Start: 2022-06-23 | End: 2022-06-24 | Stop reason: HOSPADM

## 2022-06-23 RX ORDER — SODIUM CHLORIDE 0.9 % (FLUSH) 0.9 %
5-40 SYRINGE (ML) INJECTION EVERY 12 HOURS SCHEDULED
Status: DISCONTINUED | OUTPATIENT
Start: 2022-06-23 | End: 2022-06-24 | Stop reason: HOSPADM

## 2022-06-23 RX ORDER — ONDANSETRON 4 MG/1
4 TABLET, ORALLY DISINTEGRATING ORAL EVERY 8 HOURS PRN
Status: DISCONTINUED | OUTPATIENT
Start: 2022-06-23 | End: 2022-06-24 | Stop reason: HOSPADM

## 2022-06-23 RX ORDER — POLYETHYLENE GLYCOL 3350 17 G/17G
17 POWDER, FOR SOLUTION ORAL DAILY
Status: DISCONTINUED | OUTPATIENT
Start: 2022-06-23 | End: 2022-06-24 | Stop reason: HOSPADM

## 2022-06-23 RX ORDER — POLYETHYLENE GLYCOL 3350 17 G/17G
17 POWDER, FOR SOLUTION ORAL DAILY PRN
Status: DISCONTINUED | OUTPATIENT
Start: 2022-06-23 | End: 2022-06-23

## 2022-06-23 RX ORDER — POTASSIUM CHLORIDE 7.45 MG/ML
10 INJECTION INTRAVENOUS PRN
Status: DISCONTINUED | OUTPATIENT
Start: 2022-06-23 | End: 2022-06-24 | Stop reason: HOSPADM

## 2022-06-23 RX ORDER — ACETAMINOPHEN 325 MG/1
650 TABLET ORAL EVERY 6 HOURS PRN
Status: DISCONTINUED | OUTPATIENT
Start: 2022-06-23 | End: 2022-06-24 | Stop reason: HOSPADM

## 2022-06-23 RX ADMIN — POTASSIUM CHLORIDE, DEXTROSE MONOHYDRATE AND SODIUM CHLORIDE: 150; 5; 450 INJECTION, SOLUTION INTRAVENOUS at 11:16

## 2022-06-23 RX ADMIN — POLYETHYLENE GLYCOL 3350 17 G: 17 POWDER, FOR SOLUTION ORAL at 11:17

## 2022-06-23 ASSESSMENT — PAIN - FUNCTIONAL ASSESSMENT: PAIN_FUNCTIONAL_ASSESSMENT: NONE - DENIES PAIN

## 2022-06-23 ASSESSMENT — ENCOUNTER SYMPTOMS
VOICE CHANGE: 0
ABDOMINAL PAIN: 0
TROUBLE SWALLOWING: 0
CHOKING: 0
COUGH: 0

## 2022-06-23 NOTE — DISCHARGE SUMMARY
[facility]  Office: [phone]  Mel Hebert, DO, Millie Schulz, DO, Nicolas Alicea DO, Phil Orona, DO, Jamila Giron MD, Reginald Seymour MD, Mark Wilder MD, Romario Armendariz MD, Ganga Stuart MD, Shola Lanier MD, Maryann Garnett MD, Gasper Yu DO, Radhika Nevarez MD,  Shama Francisco, DO, Kaylah Sal MD, Hope Opitz, MD, Gracie Lorenzo DO, Jcarlos Lizarraga MD, Spike Coombs MD, Lida New DO, Ailyn Valadez MD, Alexandr Willson MD, Julian Marte, [facility], [facility], CNP, Luiz Beckwith, CNP, Jared Aguila, CNP, Anshu Li, CNP, Margie Mar, CNP, Sammie Lock PA-C, Riky Hernandez [facility], Hao Palomares, CNP, Manisha Leon, CNP, Sonya Gomez, CNP, Dahlia Pa, CNS, Marcus Fuentes, [facility], Sylvie Loredo, CNP, Tamia Cardona, [facility], [facility], [address] [place] [address]    Discharge Summary     Patient ID: Marilyn Hall  :  1980   MRN: [id number]     ACCOUNT:  [de-identified]   Patient's PCP: KHLOE Hassan CNP  Admit Date: 2022   Discharge Date: 2022  Length of Stay: 0  Code Status:  Full Code  Admitting Physician: Michel Thurston MD  Discharge Physician: Chencho Yu     Active Discharge Diagnoses:     Hospital Problem Lists:  Principal Problem:    Foreign body ingestion, initial encounter  Resolved Problems:    * No resolved hospital problems. *      Admission Condition:  fair     Discharged Condition: good    Hospital Stay:     Hospital Course:      Marilyn Hall is a 43 y.o. female presented to emergency department complaining of swallowing a foreign body.     Patient reports that she was eating yesterday evening when her 3 molar bridge broke loose and she excellently swallowed it. Patient reports that she feels as if the bridge got caught in her throat.   That sensation eventually passed; however, she was concerned that she may not be able to pass through her bowel therefore she presented to the emergency department. In the emergency department plain films revealed that the bridge had moved to the mid abdomen which could be in the small bowel or transverse colon. Both GI and general surgery services were consulted. No surgical intervention at this time, it is believed that the object will pass through the bowel on its own. She was admitted to the medicine service for further care. Labs otherwise unrevealing. Significant therapeutic interventions:     #Foreign body ingestion  -Viewed both GI and general surgery notes. No surgical intervention at this time. Patient may resume normal diet. Object will hopefully pass on its own. Continue to monitor. Repeat KUB in two days.  Return to ED if significant abdominal pain develops    Significant Diagnostic Studies:   Labs / Micro:  CBC:   Lab Results   Component Value Date    WBC 9.2 06/23/2022    RBC 4.46 06/23/2022    RBC 4.21 02/06/2012    HGB 13.3 06/23/2022    HCT 41.7 06/23/2022    MCV 93.5 06/23/2022    MCH 29.8 06/23/2022    MCHC 31.9 06/23/2022    RDW 13.9 06/23/2022     06/23/2022     02/06/2012     BMP:    Lab Results   Component Value Date    GLUCOSE 97 06/23/2022    GLUCOSE 97 02/06/2012     06/23/2022    K 4.1 06/23/2022     06/23/2022    CO2 23 06/23/2022    ANIONGAP 10 06/23/2022    BUN 7 06/23/2022    CREATININE 0.58 06/23/2022    BUNCRER 12 06/23/2022    CALCIUM 8.8 06/23/2022    LABGLOM >60 06/23/2022    GFRAA >60 06/23/2022    GFR      06/23/2022     CMP:    Lab Results   Component Value Date    GLUCOSE 97 06/23/2022    GLUCOSE 97 02/06/2012     06/23/2022    K 4.1 06/23/2022     06/23/2022    CO2 23 06/23/2022    BUN 7 06/23/2022    CREATININE 0.58 06/23/2022    ANIONGAP 10 06/23/2022    ALKPHOS 89 06/04/2021    ALT 15 06/04/2021    AST 15 06/04/2021    BILITOT 0.28 06/04/2021    LABALBU 4.0 06/04/2021    ALBUMIN 1.4 06/04/2021    LABGLOM >60 06/23/2022    GFRAA >60 06/23/2022 GFR      06/23/2022    PROT 6.8 06/04/2021    CALCIUM 8.8 06/23/2022       Radiology:  XR CHEST (2 VW)    Result Date: 6/23/2022  Foreign body projects in the inferior mid abdomen, which could be within the small bowel or transverse colon. XR ABDOMEN (KUB) (SINGLE AP VIEW)    Result Date: 6/23/2022  Foreign body projects in the inferior mid abdomen, which could be within the small bowel or transverse colon. Consultations:    Consults:     Final Specialist Recommendations/Findings:   IP CONSULT TO GI  IP CONSULT TO INTERNAL MEDICINE  IP CONSULT TO GENERAL SURGERY  IP CONSULT TO GI      The patient was seen and examined on day of discharge and this discharge summary is in conjunction with any daily progress note from day of discharge. Discharge plan:     Disposition: Home    Physician Follow Up:   PCP    Requiring Further Evaluation/Follow Up POST HOSPITALIZATION/Incidental Findings:   - repeat KUB in two days    Diet: regular diet    Activity: As tolerated    Discharge Medications:      Medication List      STOP taking these medications    Chantix Continuing Month Simón 1 MG tablet  Generic drug: varenicline     Chantix Starting Month Simón 0.5 MG X 11 & 1 MG X 42 tablet  Generic drug: varenicline            Discharge Procedure Orders   XR ABDOMEN (KUB) (SINGLE AP VIEW)   Standing Status: Future Standing Exp. Date: 06/23/23     Order Specific Question Answer Comments   Reason for exam: assess progression of foreign body expulsion        Time Spent on discharge is  20 mins in patient examination, evaluation, counseling as well as medication reconciliation, prescriptions for required medications, discharge plan and follow up. Electronically signed by   Heidi Ramirez PA-C  6/23/2022  3:35 PM      Thank you Dr. Siva Aguirre, APRN - GOVIND for the opportunity to be involved in this patient's care.

## 2022-06-23 NOTE — CONSULTS
General Surgery:  Consult Note        PATIENT NAME: Agustin Sloan   YOB: 1980    ADMISSION DATE: 6/23/2022  3:58 AM     Admitting Provider: Maye German Physician: Mindy Mesa DATE: 6/23/2022    Chief Complaint:  Swallowed foreign body  Consult Regarding:  same    HISTORY OF PRESENT ILLNESS:  The patient is a 43 y.o. female  who was admitted on 6/23 after accidentally swallowing her 3 molar dental bridge. Patient has had this dental pain for 5 years. Pt works night shift and earlier this morning at 1am during her lunch break, she accidentally swallowed it. This prompted her to come to the ED. KUB does demonstrate a foreign body consistent with a 3 molar dental bridge in the area of the small bowel. For this reason, general surgery was consulted. Pt is asymptomatic: Denies CP, SOB, N/V, F/C, abdominal pain. Continues to pass flatus, no BM since arrival. Voiding without difficulty. VSS, afebrile. Past Medical History:        Diagnosis Date    Shingles     age 21       Past Surgical History:        Procedure Laterality Date    ENDOMETRIAL ABLATION  2020    HAND TENDON SURGERY      HERNIA REPAIR      x3    KIDNEY STONE SURGERY      POLYPECTOMY      colon 2015       Medications Prior to Admission:   No medications prior to admission. Allergies:  Patient has no known allergies.     Social History:   Social History     Socioeconomic History    Marital status:      Spouse name: Not on file    Number of children: Not on file    Years of education: Not on file    Highest education level: Not on file   Occupational History    Not on file   Tobacco Use    Smoking status: Current Every Day Smoker     Packs/day: 1.00    Smokeless tobacco: Never Used   Vaping Use    Vaping Use: Never used   Substance and Sexual Activity    Alcohol use: Yes     Comment: occasional    Drug use: Not Currently    Sexual activity: Not on file   Other Topics Concern    Not on file Social History Narrative    Not on file     Social Determinants of Health     Financial Resource Strain:     Difficulty of Paying Living Expenses: Not on file   Food Insecurity:     Worried About Running Out of Food in the Last Year: Not on file    Monica of Food in the Last Year: Not on file   Transportation Needs:     Lack of Transportation (Medical): Not on file    Lack of Transportation (Non-Medical): Not on file   Physical Activity:     Days of Exercise per Week: Not on file    Minutes of Exercise per Session: Not on file   Stress:     Feeling of Stress : Not on file   Social Connections:     Frequency of Communication with Friends and Family: Not on file    Frequency of Social Gatherings with Friends and Family: Not on file    Attends [employer] Services: Not on file    Active Member of [address] [place] [address] [employer] or Organizations: Not on file    Attends Club or Organization Meetings: Not on file    Marital Status: Not on file   Intimate Partner Violence:     Fear of Current or Ex-Partner: Not on file    Emotionally Abused: Not on file    Physically Abused: Not on file    Sexually Abused: Not on file   Housing Stability:     Unable to Pay for Housing in the Last Year: Not on file    Number of [place] in the Last Year: Not on file    Unstable Housing in the Last Year: Not on file       Family History:       Problem Relation Age of Onset    Other Mother         copd       REVIEW OF SYSTEMS:    CONSTITUTIONAL: Denies recent weight loss, fatigue, fevers, chills. HEENT: Denies rhinorrhea, dysphagia, odynphagia. CARDIOVASCULAR: Denies history of MI, recent chest pain. RESPIRATORY: Denies recent history of shortness of breath or history of PE. GASTROINTESTINAL: denies N/V, abdominal pain  GENITOURINARY: Denies increased frequency or dysuria. HEMATOLOGIC/LYMPHATIC: Denies history of anemia or DVTs. ENDOCRINE: Denies history of thyroid problems or diabetes. NEURO: Denies history of CVA, TIA.   Review of systems negative unless listed above. PHYSICAL EXAM:    VITALS:  /68   Pulse 59   Temp 98.6 °F (37 °C) (Oral)   Resp 16   Ht 5' 4\" (1.626 m)   Wt 132 lb (59.9 kg)   SpO2 98%   BMI 22.66 kg/m²   INTAKE/OUTPUT:   No intake or output data in the 24 hours ending 06/23/22 0855    CONSTITUTIONAL:  awake, alert, not distressed and normal weight  HEENT: Normocephalic/atraumatic, without obvious abnormality. NECK:  Supple, symmetrical, trachea midline   CARDIOVASCULAR: Regular rate and rhythm  LUNGS: Unlabored on RA  ABDOMEN: soft, ND, NT   MUSCULOSKELETAL: Muscle strength intact in all extremities bilaterally. NEUROLOGIC: CN II- XII intact. Gross motor intact without focal weakness. SKIN: No cyanosis, rashes, or edema noted.    Orientation:   oriented to person, place, and time      CBC with Differential:    Lab Results   Component Value Date    WBC 9.2 06/23/2022    RBC 4.46 06/23/2022    RBC 4.21 02/06/2012    HGB 13.3 06/23/2022    HCT 41.7 06/23/2022     06/23/2022     02/06/2012    MCV 93.5 06/23/2022    MCH 29.8 06/23/2022    MCHC 31.9 06/23/2022    RDW 13.9 06/23/2022    LYMPHOPCT 38 06/23/2022    MONOPCT 5 06/23/2022    BASOPCT 0 06/23/2022    MONOSABS 0.46 06/23/2022    LYMPHSABS 3.50 06/23/2022    EOSABS 0.28 06/23/2022    BASOSABS 0.00 06/23/2022    DIFFTYPE NOT REPORTED 06/04/2021     CMP:    Lab Results   Component Value Date     06/23/2022    K 4.1 06/23/2022     06/23/2022    CO2 23 06/23/2022    BUN 7 06/23/2022    CREATININE 0.58 06/23/2022    GFRAA >60 06/23/2022    LABGLOM >60 06/23/2022    GLUCOSE 97 06/23/2022    GLUCOSE 97 02/06/2012    PROT 6.8 06/04/2021    LABALBU 4.0 06/04/2021    CALCIUM 8.8 06/23/2022    BILITOT 0.28 06/04/2021    ALKPHOS 89 06/04/2021    AST 15 06/04/2021    ALT 15 06/04/2021     BMP:    Lab Results   Component Value Date     06/23/2022    K 4.1 06/23/2022     06/23/2022    CO2 23 06/23/2022    BUN 7 06/23/2022    LABALBU 4.0 06/04/2021    CREATININE 0.58 06/23/2022    CALCIUM 8.8 06/23/2022    GFRAA >60 06/23/2022    LABGLOM >60 06/23/2022    GLUCOSE 97 06/23/2022    GLUCOSE 97 02/06/2012       Pertinent Radiology:   XR CHEST (2 VW)    Result Date: 6/23/2022  EXAMINATION: TWO XRAY VIEWS OF THE CHEST; ONE SUPINE XRAY VIEW(S) OF THE ABDOMEN 6/23/2022 4:15 am; 6/23/2022 4:34 am COMPARISON: Chest radiograph 11/08/2020 HISTORY: ORDERING SYSTEM PROVIDED HISTORY: swallowed foreign body (include neck) TECHNOLOGIST PROVIDED HISTORY: swallowed foreign body (include neck); ORDERING SYSTEM PROVIDED HISTORY: foreign body TECHNOLOGIST PROVIDED HISTORY: foreign body FINDINGS: Chest: No foreign body identified in this region. No acute airspace disease, pneumothorax or effusion. Abdomen: Foreign body is present projecting in the inferior mid abdomen that measures up to 3.0 x 0.9 cm. No bowel dilatation. No free air. Slight curvature of the thoracolumbar spine to the left is noted, which may be positional.     Foreign body projects in the inferior mid abdomen, which could be within the small bowel or transverse colon. XR ABDOMEN (KUB) (SINGLE AP VIEW)    Result Date: 6/23/2022  EXAMINATION: TWO XRAY VIEWS OF THE CHEST; ONE SUPINE XRAY VIEW(S) OF THE ABDOMEN 6/23/2022 4:15 am; 6/23/2022 4:34 am COMPARISON: Chest radiograph 11/08/2020 HISTORY: ORDERING SYSTEM PROVIDED HISTORY: swallowed foreign body (include neck) TECHNOLOGIST PROVIDED HISTORY: swallowed foreign body (include neck); ORDERING SYSTEM PROVIDED HISTORY: foreign body TECHNOLOGIST PROVIDED HISTORY: foreign body FINDINGS: Chest: No foreign body identified in this region. No acute airspace disease, pneumothorax or effusion. Abdomen: Foreign body is present projecting in the inferior mid abdomen that measures up to 3.0 x 0.9 cm. No bowel dilatation. No free air.   Slight curvature of the thoracolumbar spine to the left is noted, which may be positional.     Foreign body projects in the inferior mid abdomen, which could be within the small bowel or transverse colon. ASSESSMENT:  Active Hospital Problems    Diagnosis Date Noted    Foreign body ingestion, initial encounter [T18. 9XXA] 06/23/2022     Priority: Medium       1. 42F with ingested foreign body    Plan:  · Examined the patient in person. Discussed the patient, history, physical, labs, imaging with the on-call attending. · Patient swallowed her 3 tooth dental bridge earlier today  · KUB shows foreign body consistent with dental bridge in the inferior mid abdomen possibly in the small bowel or transverse colon. Moderate stool burden in colon  · Okay for diet from a surgical standpoint. · Encourage ambulation  · Ordered Miralax  · No surgical intervention indicated at this time. Recommend allowing time to pass. Pt may be discharged home from a general surgery perspective. Recommend KUB in the next 1-2 days to  Monitor passage. Recommend return precautions: If Patient becomes symptomatic, abdominal pain, nausea, vomiting, fever, chills, return to ED. · Continue medical management per primary.       Electronically signed by Kierra Cochran DO  on 6/23/2022 at 8:55 AM

## 2022-06-23 NOTE — ED NOTES
ED to inpatient nurses report     Chief Complaint   Patient presents with    Foreign Body     swallowed 3 molar bridge, feels like it is in her throat      Present to ED from home with c/o foreign body in her throat. Pt states she swallowed her 3 molar bridge and feels like it is still in her throat. LOC: alert and orientated to name, place, date  Vital signs   Vitals:    06/23/22 0355 06/23/22 0356   BP:  126/72   Pulse: 82    Resp: 16    Temp: 98 °F (36.7 °C)    TempSrc: Oral    SpO2: 99%    Weight: 132 lb (59.9 kg)    Height: 5' 4\" (1.626 m)       Oxygen Baseline 99% on RA. Current needs required ---   LDAs:   Peripheral IV 06/23/22 Right Antecubital (Active)   Site Assessment Clean, dry & intact 06/23/22 0527   Line Status Blood return noted;Specimen collected;Normal saline locked 06/23/22 0527   Dressing Intervention New 06/23/22 0527     Mobility: Independent  Fall Risk: Mather 1 Fall Risk  Presents to emergency department  because of falls (Syncope, seizure, or loss of consciousness): No (06/23/22 0355)  Age > 79: No (06/23/22 0355)  Altered Mental Status, Intoxication with alcohol or substance confusion (Disorientation, impaired judgment, poor safety awaremess, or inability to follow instructions): No (06/23/22 0355)  Impaired Mobility: Ambulates or transfers with assistive devices or assistance;  Unable to ambulate or transer.: No (06/23/22 0355)  Nursing Judgement: No (06/23/22 0355)  Pending ED orders: ---  Present condition: stable  Code Status: FULL  Consults: IP CONSULT TO GI  IP CONSULT TO INTERNAL MEDICINE  IP CONSULT TO GENERAL SURGERY  [x]  Hospitalist  Completed  [x] yes [] no Who: [facility]   []  Medicine  Completed  [] yes [] No Who:   []  Cardiology  Completed  [] yes [] No Who:   [x]  GI   Completed  [x] yes [] No Who: Manas Luke   []  Neurology  Completed  [] yes [] No Who:   []  Nephrology Completed  [] yes [] No Who:    []  Vascular  Completed  [] yes [] No Who:   []  Ortho  Completed  [] yes [] No Who:     [x]  Surgery  Completed  [x] yes [] No Who: Warren   []  Urology  Completed  [] yes [] No Who:    []  CT Surgery Completed  [] yes [] No Who:   []  Podiatry  Completed  [] yes [] No Who:    []  Other    Completed  [] yes [] No Who:  Interventions: IV started, XR's completed   Important Events:         Electronically signed by Inna Ortega RN on 6/23/2022 at 5:29 AM     [facility]  06/23/22 4736

## 2022-06-23 NOTE — PLAN OF CARE
PRE CONSULT ROUNDING NOTE  HPI  43year old female with pmh of nothing who presented to the ED after accidentally swallowing her 3 molar dental bridge last night. KUB shows a foreign body c/w a dental bridge in the region of the small bowel. She has been given miralax by the surgery service with non BM so far. Cbc bmp are unremarkable. She has not had fevers chills weight loss abdominal pain dysphagia melena hematochezia hematemesis change in the bowel habits or rash.      Endoscopy normal egd years ago, benign colon polyps removed years ago-no report in epic    Family reports no hx of liver pancreatic stomach or colon cancer no uc/crohns  Social admits to  smoking occasional  etoh no illicit drugs   /61   Pulse 59   Temp 98.6 °F (37 °C) (Oral)   Resp 16   Ht 5' 4\" (1.626 m)   Wt 132 lb (59.9 kg)   SpO2 98%   BMI 22.66 kg/m²     ROS as above meds labs imaging and past medical records were reviewed    Exam  General Appearance: alert and oriented to person, place and time, well-developed and well-nourished, in no acute distress  Skin: warm and dry, no rash or erythema  Head: normocephalic and atraumatic  Eyes: pupils equal, round, and reactive to light, extraocular eye movements intact, conjunctivae normal  ENT: hearing grossly normal bilaterally  Neck: neck supple and non tender without mass, no thyromegaly or thyroid nodules, no cervical lymphadenopathy   Pulmonary/Chest: clear to auscultation bilaterally- no wheezes, rales or rhonchi, normal air movement, no respiratory distress  Cardiovascular: normal rate, regular rhythm, normal S1 and S2, no murmurs, rubs, clicks or gallops, distal pulses intact, no carotid bruits  Abdomen: soft,  non tender, non-distended, normal bowel sounds, no masses or organomegaly no ascites  Extremities: no cyanosis, clubbing or edema  Musculoskeletal: normal range of motion, no joint swelling, deformity or tenderness  Neurologic: no cranial nerve deficit and muscle strength normal    Assessment  Ingested foreign body    Plan  Will d/w md  No indication for egd at this time  Suggest serial kubs with additional meds to promote BM's will discuss possible colonoscopy   Formal gi consult to follow  . Gianna Velez, APRN - CNP

## 2022-06-23 NOTE — PROGRESS NOTES
Transitions of Care Pharmacy Service   Medication History Note      The patient does not currently take any prescription or OTC maintenance medications at home. No changes to the patient's home medication list were necessary. Source(s) of information: spoke to patient     I have made the following changes to the patient's home medication list:  Discontinued None      Other Notes None          Please feel free to call me with any questions about this encounter. Thank you. This note will be reviewed and co-signed by the [facility] Pharmacist. The pharmacist will review inpatient orders and contact the physician about any discrepancies.       Miranda Kay, pharmacy technician  [facility] Pharmacy Service  Phone:  [phone]  Fax: [phone]      Electronically signed by Miranda Kay on 6/23/2022 at 11:52 AM

## 2022-06-23 NOTE — CARE COORDINATION
CASE MANAGEMENT NOTE:    Admission Date:  6/23/2022 Junie Mclean is a 43 y.o.  female    Admitted for : Foreign body ingestion, initial encounter Jetty Sherman. 9XXA]  Swallowed foreign body, initial encounter [T18. 9XXA]    Met with:  Patient    PCP:  Delia Gee                                  Insurance:  Pola Means      Is patient alert and oriented at time of discussion:  Yes    Current Residence/ Living Arrangements:  independently at home             Current Services PTA:  No    Does patient go to outpatient dialysis: No  If yes, location and chair time: N/A    Is patient agreeable to VNS: No    Freedom of choice provided:  Yes    List of [address] provided: No    VNS chosen:  No    DME:  none    Home Oxygen: No    Nebulizer: No    CPAP/BIPAP: No    Supplier: N/A    Potential Assistance Needed: No    SNF needed: No    Freedom of choice and list provided: Yes    [facility]       Is patient currently receiving oral anticoagulation therapy? NA    [facility] greater than 14[facility]? No  If yes, pt needs a follow up appointment made within 7 days. Family Members/Caregivers that pt would like involved in their care:    Yes    If yes, list name here:  3 sons live with pt 15, 15,18 Zackary Yee boyfriend     Transportation Provider:  Family             Discharge Plan:  The Plan for Transition of Care is related to the following treatment goals: goal is home  No services needed per pt. Has help at home. 2 story home with bathrooms on both levels. The Patient and/or patient representative patient was provided with a choice of provider and agrees   with the discharge plan. [x] Yes [] No    Freedom of choice list was provided with basic dialogue that supports the patient's individualized plan of care/goals, treatment preferences and shares the quality data associated with the providers.  [x] Yes [] No                 Electronically signed by: Mary Morgan MATEO Ortiz, SUSANNAW on 6/23/2022 at 12:10 PM

## 2022-06-23 NOTE — ED PROVIDER NOTES
[facility]  Emergency Medicine Department    Pt Name: Charlotte Zuñiga  MRN: [id number]  [place] 1980  Date of evaluation: 2022  Provider: Nayan Liriano MD    CHIEF COMPLAINT     Chief Complaint   Patient presents with    Foreign Body     swallowed 3 molar bridge, feels like it is in her throat       HISTORY OF PRESENT ILLNESS  (Location/Symptom, Timing/Onset, Context/Setting,Quality, Duration, Modifying Factors, Severity.)   Charlotte Zuñiga is a 43 y.o. female who presents to the emergency department stating she swallowed a dental bridge and feels like it was caught in her throat. This occurred earlier this evening. She states it is a 3 tooth bridge that she has had for the past 5 years. She denies any shortness of breath, difficulty breathing, or choking sensation. Nursing Notes were reviewed. ALLERGIES     Patient has no known allergies. CURRENT MEDICATIONS       Previous Medications    VARENICLINE (CHANTIX CONTINUING MONTH ) 1 MG TABLET    Take 1 tablet by mouth 2 times daily    VARENICLINE (CHANTIX STARTING MONTH ) 0.5 MG X 11 & 1 MG X 42 TABLET    Take by mouth. PAST MEDICAL HISTORY         Diagnosis Date    Shingles     age 21       SURGICAL HISTORY           Procedure Laterality Date    ENDOMETRIAL ABLATION  2020    HAND TENDON SURGERY      HERNIA REPAIR      x3    KIDNEY STONE SURGERY      POLYPECTOMY      colon 2015       FAMILY HISTORY           Problem Relation Age of Onset    Other Mother         copd     Family Status   Relation Name Status    Mother      Sister  Alive        SOCIAL HISTORY      reports that she has been smoking. She has been smoking about 1.00 pack per day. She has never used smokeless tobacco. She reports current alcohol use. She reports previous drug use. REVIEW OF SYSTEMS    (2-9 systems for level 4, 10 or more for level 5)     Review of Systems   HENT: Positive for dental problem.  Negative for trouble swallowing and voice change. Respiratory: Negative for cough and choking. Cardiovascular: Negative for chest pain. Gastrointestinal: Negative for abdominal pain. Allergic/Immunologic: Negative for immunocompromised state. All other systems reviewed and are negative. PHYSICAL EXAM    (up to 7 for level 4, 8 or more for level 5)     ED Triage Vitals   BP Temp Temp Source Heart Rate Resp SpO2 Height Weight   06/23/22 0356 06/23/22 0355 06/23/22 0355 06/23/22 0355 06/23/22 0355 06/23/22 0355 06/23/22 0355 06/23/22 0355   126/72 98 °F (36.7 °C) Oral 82 16 99 % 5' 4\" (1.626 m) 132 lb (59.9 kg)       Physical Exam  Vitals and nursing note reviewed. Constitutional:       General: She is not in acute distress. Appearance: She is well-developed. HENT:      Head: Normocephalic and atraumatic. Eyes:      Extraocular Movements: Extraocular movements intact. Cardiovascular:      Rate and Rhythm: Normal rate and regular rhythm. Heart sounds: Normal heart sounds. Pulmonary:      Effort: Pulmonary effort is normal. No respiratory distress. Breath sounds: Normal breath sounds. Abdominal:      Palpations: Abdomen is soft. Tenderness: There is no abdominal tenderness. There is no guarding. Musculoskeletal:         General: No tenderness or deformity. Normal range of motion. Cervical back: Normal range of motion and neck supple. Lymphadenopathy:      Cervical: No cervical adenopathy. Skin:     General: Skin is warm and dry. Findings: No rash. Neurological:      Mental Status: She is alert and oriented to person, place, and time. Psychiatric:         Behavior: Behavior normal.         Thought Content:  Thought content normal.         Judgment: Judgment normal.         DIAGNOSTIC RESULTS     RADIOLOGY:   Non-plain film images such as CT, Ultrasound and MRI are read by theradiologist. Plain radiographic images are visualized and preliminarily interpreted by the emergency physician with the below findings:    Interpretation per the Radiologist below, if available at the time of this note:    XR CHEST (2 VW)   Final Result   Foreign body projects in the inferior mid abdomen, which could be within the   small bowel or transverse colon. XR ABDOMEN (KUB) (SINGLE AP VIEW)   Final Result   Foreign body projects in the inferior mid abdomen, which could be within the   small bowel or transverse colon. ED BEDSIDE ULTRASOUND:   Performed by ED Physician - none    LABS:  Labs Reviewed   CBC WITH AUTO DIFFERENTIAL   BASIC METABOLIC PANEL W/ REFLEX TO MG FOR LOW K       All other labs were within normal range or not returned as of this dictation. EMERGENCYDEPARTMENT COURSE and DIFFERENTIAL DIAGNOSIS/MDM:   Vitals:    Vitals:    06/23/22 0355 06/23/22 0356   BP:  126/72   Pulse: 82    Resp: 16    Temp: 98 °F (36.7 °C)    TempSrc: Oral    SpO2: 99%    Weight: 132 lb (59.9 kg)    Height: 5' 4\" (1.626 m)      61-year-old female presenting after swallowing a dental bridge. X-ray reveals the bridge is likely sitting in the small intestine. Discussed with Dr. Anna Bergman, gastroenterology, who states it is likely too far to reach by endoscopy and recommends admission for serial abdominal exam and surgery consultation. Discussed with Dr. Frida Pena, general surgery, who agrees to see the patient. Will admit to the internal medicine service for further care. CONSULTS:  IP CONSULT TO GI  IP CONSULT TO INTERNAL MEDICINE  IP CONSULT TO GENERAL SURGERY    PROCEDURES:  None indicated    FINAL IMPRESSION     1.  Swallowed foreign body, initial encounter          DISPOSITION/PLAN   DISPOSITION decision to admit    (Please note that portions of this note were completed with a voice recognition program.  Efforts were made to edit the dictations butoccasionally words are mis-transcribed.)    Lisa Bullock MD  Attending Emergency Physician          Lisa Bullock MD  06/23/22 5470

## 2022-06-23 NOTE — PLAN OF CARE
Case d/w dr Farr Amen recc kub in one week and f/u in 4 weeks with him. To return to ed for abdominal pain bleeding fevers or emesis . Niki Bunn, APRN - CNP

## 2022-06-23 NOTE — LETTER
[facility] [address] [id number]  Phone: [phone]             June 27, 2022    Patient: Daralyn Boxer   YOB: 1980   Date of Visit: 6/23/2022       To Whom It May Concern:    Daralyn Boxer was seen and treated in our facility beginning 6/23/2022 until 6/24/2022. She may return to work on 6/27/22 with no restrictions.       Sincerely,       Jenna Mckenna RN   [phone]        Signature:__________________________________

## 2022-06-23 NOTE — H&P
[facility]  Office: [address], DO, Jonna Singhr, DO, Jarrett Narrow, DO, Jo Olmanfernando Blood, DO, Janay Lemus MD, Tao Prabhakar MD, Clemente Goins MD, Regina Rea MD, Kelly Dela Cruz MD, J Luis Lima MD, Darline Melton MD, Kobe Dubon, DO, Sanjeev Cristobal MD,  Mars Medrano, DO, Mitch Flower MD, Luis Cooper MD, Jimmy Rosenberg, DO, Echo Bolivar MD, Crissy Santizo MD, Radha Odell DO, Patrick Loya MD, Marilyn Rivero MD, Valente Qureshi, [facility], [facility] Nik, CNP, Emmanuel Tong, CNP, Asaf Cabrera, CNP, Trevor Cuellra, CNP, Elisa Rivera, CNP, Agustín Eugene PA-C, Krystal Moreira, [facility], Zoie Bryan, CNP, Jada Wasserman, CNP, Dora Unger, CNP, Laura Councilman, CNS, Dodie Child, [facility], Boston Mosher, CNP, Shreyas Brito, CNP, Rober Gross, [facility]    HISTORY AND PHYSICAL EXAMINATION            Date:   6/23/2022  Patient name:  Eric Severino  Date of admission:  6/23/2022  3:58 AM  MRN:   [id number]  Account:  [de-identified]  YOB: 1980  PCP:    KHLOE Abbott CNP  Room:   [address] [place] [address]  Code Status:    Full Code    Chief Complaint:     Chief Complaint   Patient presents with    Foreign Body     swallowed 3 molar bridge, feels like it is in her throat       History Obtained From:     patient    History of Present Illness:     Eric Severino is a 43 y.o. female presented to emergency department complaining of swallowing a foreign body. Patient reports that she was eating yesterday evening when her 3 molar bridge broke loose and she excellently swallowed it. Patient reports that she feels as if the bridge got caught in her throat. That sensation eventually passed; however, she was concerned that she may not be able to pass through her bowel therefore she presented to the emergency department.   In the emergency department plain films revealed that the bridge had moved to the mid abdomen which could be in the small bowel or transverse colon. Both GI and general surgery services were consulted. No surgical intervention at this time, it is believed that the object will pass through the bowel on its own. She was admitted to the medicine service for further care. Labs otherwise unrevealing. Past Medical History:     Past Medical History:   Diagnosis Date    Shingles     age 21        Past Surgical History:     Past Surgical History:   Procedure Laterality Date    ENDOMETRIAL ABLATION  2020    HAND TENDON SURGERY      HERNIA REPAIR      x3    KIDNEY STONE SURGERY      POLYPECTOMY      colon 2015        Medications Prior to Admission:     Prior to Admission medications    Not on File        Allergies:     Patient has no known allergies. Social History:     Tobacco:    reports that she has been smoking. She has been smoking about 1.00 pack per day. She has never used smokeless tobacco.  Alcohol:      reports current alcohol use. Drug Use:  reports previous drug use. Family History:     Family History   Problem Relation Age of Onset    Other Mother         copd       Review of Systems:     Positive and Negative as described in HPI.     CONSTITUTIONAL:  negative for fevers, chills, sweats, fatigue, weight loss  HEENT:  negative for vision, hearing changes, runny nose, throat pain  RESPIRATORY:  negative for shortness of breath, cough, congestion, wheezing  CARDIOVASCULAR:  negative for chest pain, palpitations  GASTROINTESTINAL:  negative for nausea, vomiting, diarrhea, constipation, change in bowel habits, abdominal pain   GENITOURINARY:  negative for difficulty of urination, burning with urination, frequency   INTEGUMENT:  negative for rash, skin lesions, easy bruising   HEMATOLOGIC/LYMPHATIC:  negative for swelling/edema   ALLERGIC/IMMUNOLOGIC:  negative for urticaria , itching  ENDOCRINE:  negative increase in drinking, increase in urination, hot or cold intolerance  MUSCULOSKELETAL: negative joint pains, muscle aches, swelling of joints  NEUROLOGICAL:  negative for headaches, dizziness, lightheadedness, numbness, pain, tingling extremities  BEHAVIOR/PSYCH:  negative for depression, anxiety    Physical Exam:   /68   Pulse 59   Temp 98.6 °F (37 °C) (Oral)   Resp 16   Ht 5' 4\" (1.626 m)   Wt 132 lb (59.9 kg)   SpO2 98%   BMI 22.66 kg/m²   Temp (24hrs), Av.3 °F (36.8 °C), Min:98 °F (36.7 °C), Max:98.6 °F (37 °C)    No results for input(s): POCGLU in the last 72 hours. No intake or output data in the 24 hours ending 22 1021    General Appearance:  alert, well appearing, and in no acute distress  Mental status: oriented to person, place, and time  Head:  normocephalic, atraumatic  Eye: no icterus, redness, pupils equal and reactive, extraocular eye movements intact, conjunctiva clear  Ear: normal external ear, no discharge, hearing intact  Nose:  no drainage noted  Mouth: mucous membranes moist  Neck: supple, no carotid bruits, thyroid not palpable  Lungs: Bilateral equal air entry, clear to auscultation, no wheezing, rales or rhonchi, normal effort  Cardiovascular: normal rate, regular rhythm, no murmur, gallop, rub.   Abdomen: Soft, nontender, nondistended, normal bowel sounds, no hepatomegaly or splenomegaly  Neurologic: There are no new focal motor or sensory deficits, normal muscle tone and bulk, no abnormal sensation, normal speech, cranial nerves II through XII grossly intact  Skin: No gross lesions, rashes, bruising or bleeding on exposed skin area  Extremities:  peripheral pulses palpable, no pedal edema or calf pain with palpation  Psych: normal affect     Investigations:      Laboratory Testing:  Recent Results (from the past 24 hour(s))   Basic Metabolic Panel w/ Reflex to MG    Collection Time: 22  5:26 AM   Result Value Ref Range    Glucose 97 70 - 99 mg/dL    BUN 7 6 - 20 mg/dL    CREATININE 0.58 0.50 - 0.90 mg/dL    Bun/Cre Ratio 12 9 - 20    Calcium 8.8 8.6 - 10.4 mg/dL    Sodium 138 135 - 144 mmol/L    Potassium 4.1 3.7 - 5.3 mmol/L    Chloride 105 98 - 107 mmol/L    CO2 23 20 - 31 mmol/L    Anion Gap 10 9 - 17 mmol/L    GFR Non-African American >60 >60 mL/min    GFR African American >60 >60 mL/min    GFR Comment         CBC with Auto Differential    Collection Time: 06/23/22  5:26 AM   Result Value Ref Range    WBC 9.2 3.5 - 11.3 k/uL    RBC 4.46 3.95 - 5.11 m/uL    Hemoglobin 13.3 11.9 - 15.1 g/dL    Hematocrit 41.7 36.3 - 47.1 %    MCV 93.5 82.6 - 102.9 fL    MCH 29.8 25.2 - 33.5 pg    MCHC 31.9 28.4 - 34.8 g/dL    RDW 13.9 11.8 - 14.4 %    Platelets 982 170 - 282 k/uL    MPV 9.6 8.1 - 13.5 fL    NRBC Automated 0.0 0.0 per 100 WBC    Seg Neutrophils 54 36 - 66 %    Lymphocytes 38 24 - 44 %    Monocytes 5 1 - 7 %    Eosinophils % 3 1 - 4 %    Basophils 0 %    Immature Granulocytes 0 0 %    Segs Absolute 4.96 1.8 - 7.7 k/uL    Absolute Lymph # 3.50 1.0 - 4.8 k/uL    Absolute Mono # 0.46 0.2 - 0.8 k/uL    Absolute Eos # 0.28 0.0 - 0.4 k/uL    Basophils Absolute 0.00 0.0 - 0.2 k/uL    Absolute Immature Granulocyte 0.00 0.00 - 0.30 k/uL       Imaging/Diagnostics:  XR CHEST (2 VW)    Result Date: 6/23/2022  Foreign body projects in the inferior mid abdomen, which could be within the small bowel or transverse colon. XR ABDOMEN (KUB) (SINGLE AP VIEW)    Result Date: 6/23/2022  Foreign body projects in the inferior mid abdomen, which could be within the small bowel or transverse colon. Assessment :      Hospital Problems           Last Modified POA    * (Principal) Foreign body ingestion, initial encounter 6/23/2022 Yes          Plan:     Patient status inpatient in the Med/Surge    #Foreign body ingestion  -Viewed both GI and general surgery notes. No surgical intervention at this time. Patient may resume normal diet. Object will hopefully pass on its own. Continue to monitor.     Consultations:   IP CONSULT TO GI  IP CONSULT TO INTERNAL MEDICINE  IP CONSULT TO GENERAL SURGERY  IP CONSULT TO GI    Patient is admitted as inpatient status because of co-morbidities listed above, severity of signs and symptoms as outlined, requirement for current medical therapies and most importantly because of direct risk to patient if care not provided in a hospital setting. Expected length of stay > 48 hours.     Michael Bynum PA-C  6/23/2022  10:21 AM    Copy sent to Dr. Charles Cruz, APRN - CNP

## 2022-06-24 ENCOUNTER — APPOINTMENT (OUTPATIENT)
Dept: GENERAL RADIOLOGY | Age: 42
DRG: 395 | End: 2022-06-24
Payer: COMMERCIAL

## 2022-06-24 VITALS
SYSTOLIC BLOOD PRESSURE: 103 MMHG | BODY MASS INDEX: 22.53 KG/M2 | TEMPERATURE: 98.2 F | HEART RATE: 83 BPM | OXYGEN SATURATION: 95 % | DIASTOLIC BLOOD PRESSURE: 72 MMHG | WEIGHT: 132 LBS | RESPIRATION RATE: 16 BRPM | HEIGHT: 64 IN

## 2022-06-24 LAB
ALBUMIN SERPL-MCNC: 3.6 G/DL (ref 3.5–5.2)
ALP BLD-CCNC: 69 U/L (ref 35–104)
ALT SERPL-CCNC: 9 U/L (ref 5–33)
AMYLASE: 40 U/L (ref 28–100)
ANION GAP SERPL CALCULATED.3IONS-SCNC: 8 MMOL/L (ref 9–17)
AST SERPL-CCNC: 10 U/L
BILIRUB SERPL-MCNC: 0.28 MG/DL (ref 0.3–1.2)
BUN BLDV-MCNC: 5 MG/DL (ref 6–20)
BUN/CREAT BLD: 8 (ref 9–20)
CALCIUM SERPL-MCNC: 8.3 MG/DL (ref 8.6–10.4)
CHLORIDE BLD-SCNC: 108 MMOL/L (ref 98–107)
CO2: 24 MMOL/L (ref 20–31)
CREAT SERPL-MCNC: 0.61 MG/DL (ref 0.5–0.9)
GFR AFRICAN AMERICAN: >60 ML/MIN
GFR NON-AFRICAN AMERICAN: >60 ML/MIN
GFR SERPL CREATININE-BSD FRML MDRD: ABNORMAL ML/MIN/{1.73_M2}
GLUCOSE BLD-MCNC: 132 MG/DL (ref 70–99)
LIPASE: 28 U/L (ref 13–60)
MAGNESIUM: 1.9 MG/DL (ref 1.6–2.6)
PHOSPHORUS: 2.8 MG/DL (ref 2.6–4.5)
POTASSIUM SERPL-SCNC: 4.6 MMOL/L (ref 3.7–5.3)
SODIUM BLD-SCNC: 140 MMOL/L (ref 135–144)
TOTAL PROTEIN: 5.5 G/DL (ref 6.4–8.3)

## 2022-06-24 PROCEDURE — 83735 ASSAY OF MAGNESIUM: CPT

## 2022-06-24 PROCEDURE — 83690 ASSAY OF LIPASE: CPT

## 2022-06-24 PROCEDURE — 80053 COMPREHEN METABOLIC PANEL: CPT

## 2022-06-24 PROCEDURE — 99238 HOSP IP/OBS DSCHRG MGMT 30/<: CPT | Performed by: PHYSICIAN ASSISTANT

## 2022-06-24 PROCEDURE — 2500000003 HC RX 250 WO HCPCS: Performed by: NURSE PRACTITIONER

## 2022-06-24 PROCEDURE — G0378 HOSPITAL OBSERVATION PER HR: HCPCS

## 2022-06-24 PROCEDURE — 96361 HYDRATE IV INFUSION ADD-ON: CPT

## 2022-06-24 PROCEDURE — 84100 ASSAY OF PHOSPHORUS: CPT

## 2022-06-24 PROCEDURE — 36415 COLL VENOUS BLD VENIPUNCTURE: CPT

## 2022-06-24 PROCEDURE — 99232 SBSQ HOSP IP/OBS MODERATE 35: CPT | Performed by: INTERNAL MEDICINE

## 2022-06-24 PROCEDURE — 74018 RADEX ABDOMEN 1 VIEW: CPT

## 2022-06-24 PROCEDURE — 6370000000 HC RX 637 (ALT 250 FOR IP): Performed by: STUDENT IN AN ORGANIZED HEALTH CARE EDUCATION/TRAINING PROGRAM

## 2022-06-24 PROCEDURE — 82150 ASSAY OF AMYLASE: CPT

## 2022-06-24 RX ORDER — POLYETHYLENE GLYCOL 3350 17 G/17G
17 POWDER, FOR SOLUTION ORAL DAILY
Qty: 510 G | Refills: 0 | Status: SHIPPED | OUTPATIENT
Start: 2022-06-24 | End: 2022-06-27

## 2022-06-24 RX ADMIN — MAGNESIUM HYDROXIDE 30 ML: 2400 SUSPENSION ORAL at 08:55

## 2022-06-24 RX ADMIN — POTASSIUM CHLORIDE, DEXTROSE MONOHYDRATE AND SODIUM CHLORIDE: 150; 5; 450 INJECTION, SOLUTION INTRAVENOUS at 03:29

## 2022-06-24 RX ADMIN — POLYETHYLENE GLYCOL 3350 17 G: 17 POWDER, FOR SOLUTION ORAL at 08:55

## 2022-06-24 NOTE — PLAN OF CARE
Problem: Discharge Planning  Goal: Discharge to home or other facility with appropriate resources  Outcome: Progressing Split-Thickness Skin Graft Text: The defect edges were debeveled with a #15 scalpel blade.  Given the location of the defect, shape of the defect and the proximity to free margins a split thickness skin graft was deemed most appropriate.  Using a sterile surgical marker, the primary defect shape was transferred to the donor site. The split thickness graft was then harvested.  The skin graft was then placed in the primary defect and oriented appropriately.

## 2022-06-24 NOTE — PROGRESS NOTES
General Surgery:  Progress Note        PATIENT NAME: Lizeth Miguel   YOB: 1980    ADMISSION DATE: 6/23/2022  3:58 AM      TODAY'S DATE: 6/24/2022    Chief Complaint:  Swallowed foreign body    Subjective:  Patient was seen and examined at bedside, no overnight events. Patient reports she remains asymptomatic. Denies CP, SOB, N/V, C/F, abdominal pain. Is passing flatus, no bowel movement yet. VSS, afebrile      REVIEW OF SYSTEMS:    CONSTITUTIONAL: Denies recent weight loss, fatigue, fevers, chills. HEENT: Denies rhinorrhea, dysphagia, odynphagia. CARDIOVASCULAR: Denies history of MI, recent chest pain. RESPIRATORY: Denies recent history of shortness of breath or history of PE. GASTROINTESTINAL: denies N/V, abdominal pain  GENITOURINARY: Denies increased frequency or dysuria. HEMATOLOGIC/LYMPHATIC: Denies history of anemia or DVTs. ENDOCRINE: Denies history of thyroid problems or diabetes. NEURO: Denies history of CVA, TIA. Review of systems negative unless listed above. PHYSICAL EXAM:    VITALS:  BP 96/66   Pulse 75   Temp 98.1 °F (36.7 °C)   Resp 16   Ht 5' 4\" (1.626 m)   Wt 132 lb (59.9 kg)   SpO2 94%   BMI 22.66 kg/m²   INTAKE/OUTPUT:     Intake/Output Summary (Last 24 hours) at 6/24/2022 0505  Last data filed at 6/23/2022 1606  Gross per 24 hour   Intake 361.7 ml   Output 300 ml   Net 61.7 ml       CONSTITUTIONAL:  awake, alert, not distressed and normal weight  HEENT: Normocephalic/atraumatic, without obvious abnormality. NECK:  Supple, symmetrical, trachea midline   CARDIOVASCULAR: Regular rate and rhythm  LUNGS: Unlabored on RA  ABDOMEN: soft, ND, NT   MUSCULOSKELETAL: Muscle strength intact in all extremities bilaterally. NEUROLOGIC: CN II- XII intact. Gross motor intact without focal weakness. SKIN: No cyanosis, rashes, or edema noted.    Orientation:   oriented to person, place, and time      CBC with Differential:    Lab Results   Component Value Date    WBC 9.2 06/23/2022    RBC 4.46 06/23/2022    RBC 4.21 02/06/2012    HGB 13.3 06/23/2022    HCT 41.7 06/23/2022     06/23/2022     02/06/2012    MCV 93.5 06/23/2022    MCH 29.8 06/23/2022    MCHC 31.9 06/23/2022    RDW 13.9 06/23/2022    LYMPHOPCT 38 06/23/2022    MONOPCT 5 06/23/2022    BASOPCT 0 06/23/2022    MONOSABS 0.46 06/23/2022    LYMPHSABS 3.50 06/23/2022    EOSABS 0.28 06/23/2022    BASOSABS 0.00 06/23/2022    DIFFTYPE NOT REPORTED 06/04/2021     CMP:    Lab Results   Component Value Date     06/23/2022    K 4.1 06/23/2022     06/23/2022    CO2 23 06/23/2022    BUN 7 06/23/2022    CREATININE 0.58 06/23/2022    GFRAA >60 06/23/2022    LABGLOM >60 06/23/2022    GLUCOSE 97 06/23/2022    GLUCOSE 97 02/06/2012    PROT 6.8 06/04/2021    LABALBU 4.0 06/04/2021    CALCIUM 8.8 06/23/2022    BILITOT 0.28 06/04/2021    ALKPHOS 89 06/04/2021    AST 15 06/04/2021    ALT 15 06/04/2021     BMP:    Lab Results   Component Value Date     06/23/2022    K 4.1 06/23/2022     06/23/2022    CO2 23 06/23/2022    BUN 7 06/23/2022    LABALBU 4.0 06/04/2021    CREATININE 0.58 06/23/2022    CALCIUM 8.8 06/23/2022    GFRAA >60 06/23/2022    LABGLOM >60 06/23/2022    GLUCOSE 97 06/23/2022    GLUCOSE 97 02/06/2012       Pertinent Radiology:   XR CHEST (2 VW)    Result Date: 6/23/2022  EXAMINATION: TWO XRAY VIEWS OF THE CHEST; ONE SUPINE XRAY VIEW(S) OF THE ABDOMEN 6/23/2022 4:15 am; 6/23/2022 4:34 am COMPARISON: Chest radiograph 11/08/2020 HISTORY: ORDERING SYSTEM PROVIDED HISTORY: swallowed foreign body (include neck) TECHNOLOGIST PROVIDED HISTORY: swallowed foreign body (include neck); ORDERING SYSTEM PROVIDED HISTORY: foreign body TECHNOLOGIST PROVIDED HISTORY: foreign body FINDINGS: Chest: No foreign body identified in this region. No acute airspace disease, pneumothorax or effusion. Abdomen: Foreign body is present projecting in the inferior mid abdomen that measures up to 3.0 x 0.9 cm.   No bowel dilatation. No free air. Slight curvature of the thoracolumbar spine to the left is noted, which may be positional.     Foreign body projects in the inferior mid abdomen, which could be within the small bowel or transverse colon. XR ABDOMEN (KUB) (SINGLE AP VIEW)    Result Date: 6/23/2022  EXAMINATION: TWO XRAY VIEWS OF THE CHEST; ONE SUPINE XRAY VIEW(S) OF THE ABDOMEN 6/23/2022 4:15 am; 6/23/2022 4:34 am COMPARISON: Chest radiograph 11/08/2020 HISTORY: ORDERING SYSTEM PROVIDED HISTORY: swallowed foreign body (include neck) TECHNOLOGIST PROVIDED HISTORY: swallowed foreign body (include neck); ORDERING SYSTEM PROVIDED HISTORY: foreign body TECHNOLOGIST PROVIDED HISTORY: foreign body FINDINGS: Chest: No foreign body identified in this region. No acute airspace disease, pneumothorax or effusion. Abdomen: Foreign body is present projecting in the inferior mid abdomen that measures up to 3.0 x 0.9 cm. No bowel dilatation. No free air. Slight curvature of the thoracolumbar spine to the left is noted, which may be positional.     Foreign body projects in the inferior mid abdomen, which could be within the small bowel or transverse colon. ASSESSMENT:  Active Hospital Problems    Diagnosis Date Noted    Swallowed foreign body [T18. 9XXA] 06/23/2022     Priority: Medium       1. 42F with ingested foreign body    Plan:  · Examined the patient in person. Patient doing well, asymptomatic  · Follow-up a.m. KUB  · Okay for diet from a surgical standpoint. · Encourage ambulation  · Ordered Miralax and milk of magnesia. · No surgical intervention indicated at this time. Recommend allowing time to pass. · Pt may be discharged home from a general surgery perspective. Recommend return precautions: If Patient becomes symptomatic, abdominal pain, nausea, vomiting, fever, chills, return to ED. Surgery to sign off at this time. Call with any questions. · Continue medical management per primary.       Electronically signed by Sudeep Sanchez DO  on 6/24/2022 at 5:05 AM

## 2022-06-24 NOTE — PROGRESS NOTES
[facility]    Gastroenterology Daily Progress Note      Patient:   Sudeep Hurt   :    1980   Facility:   [facility]  Date:     2022  Consultant:   Daymon Aschoff, APRN - CNP, CNP      SUBJECTIVE  43 y.o. female admitted 2022 with Foreign body ingestion, initial encounter Carolyn Dove. 9XXA]  Swallowed foreign body, initial encounter [T18. 9XXA] and seen for ingestion of foreign body. The pt was seen and examined. No BM overnight. KUB this am is pending. No c/o abdominal pain or nausea. Tolerating a regular diet.  .        OBJECTIVE  Scheduled Meds:   magnesium hydroxide  30 mL Oral Daily    sodium chloride flush  5-40 mL IntraVENous 2 times per day    enoxaparin  40 mg SubCUTAneous Daily    polyethylene glycol  17 g Oral Daily       Vital Signs:  BP 96/66   Pulse 75   Temp 98.1 °F (36.7 °C)   Resp 16   Ht 5' 4\" (1.626 m)   Wt 132 lb (59.9 kg)   SpO2 94%   BMI 22.66 kg/m²      Physical Exam:   General Appearance: alert and oriented to person, place and time, well-developed and well-nourished, in no acute distress  Skin: warm and dry, no rash or erythema  Head: normocephalic and atraumatic  Eyes: pupils equal, round, and reactive to light, extraocular eye movements intact, conjunctivae normal  ENT: hearing grossly normal bilaterally  Neck: neck supple and non tender without mass, no thyromegaly or thyroid nodules, no cervical lymphadenopathy   Pulmonary/Chest: clear to auscultation bilaterally- no wheezes, rales or rhonchi, normal air movement, no respiratory distress  Cardiovascular: normal rate, regular rhythm, normal S1 and S2, no murmurs, rubs, clicks or gallops, distal pulses intact, no carotid bruits  Abdomen: soft, non-tender, non-distended, normal bowel sounds, no masses or organomegaly  Extremities: no cyanosis, clubbing or edema  Musculoskeletal: normal range of motion, no joint swelling, deformity or tenderness  Neurologic: no cranial nerve deficit and muscle strength normal    Lab and Imaging Review     CBC  Recent Labs     06/23/22  0526   WBC 9.2   HGB 13.3   HCT 41.7   MCV 93.5          BMP  Recent Labs     06/23/22  0526 06/24/22  0525    140   K 4.1 4.6    108*   CO2 23 24   BUN 7 5*   CREATININE 0.58 0.61   GLUCOSE 97 132*   CALCIUM 8.8 8.3*       LFTS  Recent Labs     06/24/22  0525   ALKPHOS 69   ALT 9   AST 10   PROT 5.5*   BILITOT 0.28*   LABALBU 3.6       AMYLASE/LIPASE/AMMONIA  Recent Labs     06/24/22  0525   AMYLASE 40   LIPASE 28       FINDINGS:kub 6/23/22   Chest: No foreign body identified in this region.  No acute airspace disease,   pneumothorax or effusion.       Abdomen: Foreign body is present projecting in the inferior mid abdomen that   measures up to 3.0 x 0.9 cm.  No bowel dilatation.  No free air.  Slight   curvature of the thoracolumbar spine to the left is noted, which may be   positional.           Impression   Foreign body projects in the inferior mid abdomen, which could be within the   small bowel or transverse colon. ASSESSMENT/plan  1. Accidental ingestion of foreign body (denture)  -pt asymptomatic, kub pending this am  -no endoscopy at this time  -outpt follow up     D/w dr Domenica Sharma signing off f/u oupt        This plan was formulated in collaboration with  . Electronically signed by: KHLOE Mckeon CNP on 6/24/2022 at 6:54 AM     Attending Physician Statement  I have discussed the care of Agustin Sloan and   I have examined the patient myselft independently, and taken ros and hpi , including pertinent history and exam findings,  with the author of this note . I have reviewed the key elements of all parts of the encounter with the nurse practitioner/resident.     I agree with the assessment, plan and orders as documented by the above health care provider       Patient notes to follow with us as an outpatient  Will make sure the foreign body is extracted completely  She was alerted and indicated to come back if there is any pain nausea vomiting or fever or any bleeding or any new symptom  Patient does understand very well  Electronically signed by Andre Brooks MD

## 2022-06-24 NOTE — CONSULTS
Gastroenterology Consult Note      Patient: Lizeth Miguel  : 1980  Acct#:  [de-identified]     Date:  2022    Subjective:       History of Present Illness  Patient is a 43 y.o.  female admitted with Foreign body ingestion, initial encounter [T18. 9XXA]  Swallowed foreign body, initial encounter [T18. 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  She said she swallowed her 3-6 denture prosthesis  At 1 AM  She came to the emergency room around 3:34 AM  And the KUB showed that the foreign body is in the distal small bowel, versus in the transverse colon  Patient is doing remarkably well she has no abdominal pain no nausea no vomiting  No fever no chills no bleeding  In fact she wants to go home but then she agreed to stay until the morning  Never done this before  The dental prosthesis seems not to have any wires but there might be a little bit sharp end which look like a sabino\this is the best the patient could describe it            Past Medical History:   Diagnosis Date    Shingles     age 21      Past Surgical History:   Procedure Laterality Date    ENDOMETRIAL ABLATION  2020    HAND TENDON SURGERY      HERNIA REPAIR      x3    KIDNEY STONE SURGERY      POLYPECTOMY      colon 2015      Past Endoscopic History   Endoscopy normal egd years ago, benign colon polyps removed years ago-no report in [facility]    Admission Meds  No current facility-administered medications on file prior to encounter. No current outpatient medications on file prior to encounter.        Patient   Does Use ASA, NSAID No  Allergies  No Known Allergies     Social   Social History     Tobacco Use    Smoking status: Current Every Day Smoker     Packs/day: 1.00    Smokeless tobacco: Never Used   Substance Use Topics    Alcohol use: Yes     Comment: occasional        PSYCH HISTORY:  Depression No  Anxiety No  Suicide No       Family History   Problem Relation Age of Onset    Other Mother         copd      No family history of colon cancer, Crohn's disease, or ulcerative colitis. Review of Systems  Constitutional: negative  Eyes: negative  Ears, nose, mouth, throat, and face: negative  Respiratory: negative  Cardiovascular: negative  Gastrointestinal: negative  Genitourinary:negative  Integument/breast: negative  Hematologic/lymphatic: negative  Musculoskeletal:negative  Endocrine: negative           Physical Exam  Blood pressure 101/88, pulse 91, temperature 98.2 °F (36.8 °C), resp. rate 16, height 5' 4\" (1.626 m), weight 132 lb (59.9 kg), SpO2 95 %, not currently breastfeeding. General Appearance: alert and oriented to person, place and time, well-developed and well-nourished, in no acute distress  Skin: warm and dry, no rash or erythema  Head: normocephalic and atraumatic  Eyes: pupils equal, round, and reactive to light, extraocular eye movements intact, conjunctivae normal  ENT: hearing grossly normal bilaterally  Neck: neck supple and non tender without mass, no thyromegaly or thyroid nodules, no cervical lymphadenopathy   Pulmonary/Chest: clear to auscultation bilaterally- no wheezes, rales or rhonchi, normal air movement, no respiratory distress  Cardiovascular: normal rate, regular rhythm, normal S1 and S2, no murmurs, rubs, clicks or gallops, distal pulses intact, no carotid bruits  Abdomen: soft, non-tender, non-distended, normal bowel sounds, no masses or organomegaly  Extremities: no cyanosis, clubbing or edema  Musculoskeletal: normal range of motion, no joint swelling, deformity or tenderness  Neurologic: no cranial nerve deficit and muscle strength normal    Data Review:    Recent Labs     06/23/22  0526   WBC 9.2   HGB 13.3   HCT 41.7   MCV 93.5        Recent Labs     06/23/22  0526      K 4.1      CO2 23   BUN 7   CREATININE 0.58     No results for input(s): AST, ALT, ALB, BILIDIR, BILITOT, ALKPHOS in the last 72 hours.   No results for input(s): LIPASE, AMYLASE in the last 72 hours. No results for input(s): PROTIME, INR in the last 72 hours. No results for input(s): PTT in the last 72 hours. No results for input(s): OCCULTBLD in the last 72 hours. CEA:  No results found for: CEA  Ca 125:  No results found for:   Ca 19-9:  No results found for:   Ca 15-3:  No results found for:   AFP:  No components found for: AFAFP  Beta HCG:  No components found for: BHCG  Neuron Specific Enolase:  No results found for: NSE  Imaging Studies:                           All appropriate imaging studies and reports reviewed: Yes                 Assessment:     Principal Problem:    Swallowed foreign body  Resolved Problems:    * No resolved hospital problems. *    Ingested a foreign body which looks in the distal small bowel versus transverse colon on the x-ray    Recommendations: We will repeat the KUB in the morning  To confirm that and make it all the way to the colon  As if it still in the small bowel MI has to go through the ileocecal valve with some difficulty                          Thank you for allowing me to participate in the care of your patient. Please feel free to contact me with any questions or concerns.      Christopher Donald MD

## 2022-06-24 NOTE — DISCHARGE SUMMARY
[facility]  Office: [address], DO, Kurtis Blake, DO, Silvia Mckeon, DO, Romeoluz Blue Blood, DO, Ban Núñez MD, Danni Smith MD, Gordon Wetzel MD, Aniyah Bella MD, Rola Raygoza MD, Sapphire Lagos MD, Andrei Tran MD, Media Sensor, DO, Elmo Morris MD,  Rafa Oliver, DO, Abraham Leblanc MD, Sangita White MD, Ana Wagner, DO, Suyapa Black MD, Sherie Davis MD, Toni Callejas, DO, Siomara Tello MD, Odalys Mercado MD, Carrillo Montoya, [facility], [facility], CNP, Gabe Martinez, CNP, Ambreen Ahn, CNP, Selma Garrett, CNP, Elizabeth Alejandra, CNP, Hailey Davila PA-C, Taylor Boateng, [facility], Shellie Julio, CNP, Rina Cam, CNP, Tino Victoria, CNP, Lisa Waters, CNS, Bea Goins, [facility], Mary Gaston, CNP, Sameer Wilson, [facility], [facility], [address] [place] [address]    Discharge Summary     Patient ID: Sudeep Hurt  :  1980   MRN: [id number]     ACCOUNT:  [de-identified]   Patient's PCP: KHLOE Conn CNP  Admit Date: 2022   Discharge Date: 2022  Length of Stay: 1  Code Status:  Full Code  Admitting Physician: Yulia South MD  Discharge Physician: Sophie Macias     Active Discharge Diagnoses:     Hospital Problem Lists:  Principal Problem:    Swallowed foreign body  Resolved Problems:    * No resolved hospital problems. *      Admission Condition:  fair     Discharged Condition: good    Hospital Stay:     Hospital Course:      Sudeep Hurt is a 43 y.o. female presented to emergency department complaining of swallowing a foreign body.     Patient reports that she was eating yesterday evening when her 3 molar bridge broke loose and she excellently swallowed it. Patient reports that she feels as if the bridge got caught in her throat.   That sensation eventually passed; however, she was concerned that she may not be able to pass through her bowel therefore she presented to the emergency department. In the emergency department plain films revealed that the bridge had moved to the mid abdomen which could be in the small bowel or transverse colon. Both GI and general surgery services were consulted. No surgical intervention at this time, it is believed that the object will pass through the bowel on its own. She was admitted to the medicine service for further care. Labs otherwise unrevealing. Significant therapeutic interventions:     #Foreign body ingestion  -Viewed both GI and general surgery notes. No surgical intervention at this time. Patient may resume normal diet. Object will hopefully pass on its own. Continue to monitor. Repeat KUB on 6/26.  Return to ED if significant abdominal pain develops    Significant Diagnostic Studies:   Labs / Micro:  CBC:   Lab Results   Component Value Date    WBC 9.2 06/23/2022    RBC 4.46 06/23/2022    RBC 4.21 02/06/2012    HGB 13.3 06/23/2022    HCT 41.7 06/23/2022    MCV 93.5 06/23/2022    MCH 29.8 06/23/2022    MCHC 31.9 06/23/2022    RDW 13.9 06/23/2022     06/23/2022     02/06/2012     BMP:    Lab Results   Component Value Date    GLUCOSE 132 06/24/2022    GLUCOSE 97 02/06/2012     06/24/2022    K 4.6 06/24/2022     06/24/2022    CO2 24 06/24/2022    ANIONGAP 8 06/24/2022    BUN 5 06/24/2022    CREATININE 0.61 06/24/2022    BUNCRER 8 06/24/2022    CALCIUM 8.3 06/24/2022    LABGLOM >60 06/24/2022    GFRAA >60 06/24/2022    GFR      06/24/2022     CMP:    Lab Results   Component Value Date    GLUCOSE 132 06/24/2022    GLUCOSE 97 02/06/2012     06/24/2022    K 4.6 06/24/2022     06/24/2022    CO2 24 06/24/2022    BUN 5 06/24/2022    CREATININE 0.61 06/24/2022    ANIONGAP 8 06/24/2022    ALKPHOS 69 06/24/2022    ALT 9 06/24/2022    AST 10 06/24/2022    BILITOT 0.28 06/24/2022    LABALBU 3.6 06/24/2022    ALBUMIN 1.4 06/04/2021    LABGLOM >60 06/24/2022    GFRAA >60 06/24/2022    GFR      06/24/2022 PROT 5.5 06/24/2022    CALCIUM 8.3 06/24/2022       Radiology:  XR CHEST (2 VW)    Result Date: 6/23/2022  Foreign body projects in the inferior mid abdomen, which could be within the small bowel or transverse colon. XR ABDOMEN (KUB) (SINGLE AP VIEW)    Result Date: 6/23/2022  Foreign body projects in the inferior mid abdomen, which could be within the small bowel or transverse colon. Consultations:    Consults:     Final Specialist Recommendations/Findings:   IP CONSULT TO GI  IP CONSULT TO INTERNAL MEDICINE  IP CONSULT TO GENERAL SURGERY  IP CONSULT TO GI      The patient was seen and examined on day of discharge and this discharge summary is in conjunction with any daily progress note from day of discharge. Discharge plan:     Disposition: Home    Physician Follow Up:   PCP    Requiring Further Evaluation/Follow Up POST HOSPITALIZATION/Incidental Findings:   - repeat KUB on 6/26 if object has not passed    Diet: regular diet    Activity: As tolerated    Discharge Medications:      Medication List      START taking these medications    polyethylene glycol 17 GM/SCOOP powder  Commonly known as: GLYCOLAX  Take 17 g by mouth daily for 3 days        STOP taking these medications    Chantix Continuing Month Simón 1 MG tablet  Generic drug: varenicline     Chantix Starting Month Simón 0.5 MG X 11 & 1 MG X 42 tablet  Generic drug: varenicline           Where to Get Your Medications      These medications were sent to [facility] [place] [address]    Phone: [phone]   · polyethylene glycol 17 GM/SCOOP powder         Discharge Procedure Orders   XR ABDOMEN (KUB) (SINGLE AP VIEW)   Standing Status: Future Standing Exp.  Date: 06/24/23     Order Specific Question Answer Comments   Reason for exam: assess progression of foreign body expulsion        Time Spent on discharge is  20 mins in patient examination, evaluation, counseling as well as medication reconciliation, prescriptions for required medications, discharge plan and follow up. Electronically signed by   Yenny Welsh PA-C  6/24/2022  12:41 PM      Thank you KHLOE Dean Ace - GOVIND for the opportunity to be involved in this patient's care.

## 2022-06-24 NOTE — PROGRESS NOTES
CLINICAL PHARMACY NOTE: MEDS TO BEDS    Total # of Prescriptions Filled: 0   The following medications were delivered to the patient:  · None    Additional Documentation:    Pt had an Rx for Miralax powder that we filled for her as M2B, but she was discharged and went home before it could be delivered. We called the pt at home and explained the situation.  We are transferring the Rx to her local [facility] per her request.

## 2022-06-24 NOTE — PROGRESS NOTES
[facility]  Office: [address], DO, [place] Negron, DO, Dewayne Signs, DO, Tracy Peter Blood, DO, Dong Prieto MD, Mignon Meckel, MD, Margot Cunningham MD, Dora Saldaña MD, Olga Prieto MD, Janine Fuentes MD, Jasmeet Love MD, Christa Sue, DO, Leonarda Romeo MD,  Bakari Cary, DO, Dorys Coombs MD, Maryann Yang MD, Huey Mendez, DO, Suzi Tyson MD, Aurora Flowers MD, Bill Beard, DO, Nabila Mcginnis MD, Yury Angeles MD, Quinn Jennings, [facility], [facility], CNP, Kosta Lamb, CNP, Loren Stephens, CNP, Daniel Genao, CNP, Toni Elizondo, CNP, Gerard Sanchez PA-C, Sarah Fulton, [facility], Bharat Wilson, CNP, Caryle Poet, CNP, Dung Grewal, CNP, Kalani Noel, CNS, Dimple Bryant, [facility], Trevor Barrientos, CNP, Seamus Cameron, CNP, Juan Kaplan, [address] [place] [address]    Progress Note    6/24/2022    12:34 PM    Name:   Lizeth Miguel  MRN:     [id number]     Acct:      [de-identified]   Room:   [address] [place] [address] Day:  1  Admit Date:  6/23/2022  3:58 AM    PCP:   KHLOE Sun CNP  Code Status:  Full Code    Subjective:     C/C:   Chief Complaint   Patient presents with    Foreign Body     swallowed 3 molar bridge, feels like it is in her throat     Interval History Status: not changed. Pt seen and evaluated this morning. No new complaints. Denies abdominal pain. KUB showing that dental bridge has moved past cecal valve. She should pass it on her own. She is cleared for discharge.      Review of Systems:     Constitutional:  negative for chills, fevers, sweats  Respiratory:  negative for cough, dyspnea on exertion, shortness of breath, wheezing  Cardiovascular:  negative for chest pain, chest pressure/discomfort, lower extremity edema, palpitations  Gastrointestinal:  negative for abdominal pain, constipation, diarrhea, nausea, vomiting  Neurological:  negative for dizziness, headache    Medications: Allergies:  No Known Allergies    Current Meds:   Scheduled Meds:    magnesium hydroxide  30 mL Oral Daily    sodium chloride flush  5-40 mL IntraVENous 2 times per day    enoxaparin  40 mg SubCUTAneous Daily    polyethylene glycol  17 g Oral Daily     Continuous Infusions:    dextrose 5% and 0.45% NaCl with KCl 20 mEq Stopped (22 1112)    sodium chloride       PRN Meds: sodium chloride flush, sodium chloride, potassium chloride **OR** potassium alternative oral replacement **OR** potassium chloride, magnesium sulfate, ondansetron **OR** ondansetron, acetaminophen **OR** acetaminophen    Data:     Past Medical History:   has a past medical history of Shingles. Social History:   reports that she has been smoking. She has been smoking about 1.00 pack per day. She has never used smokeless tobacco. She reports current alcohol use. She reports previous drug use. Family History:   Family History   Problem Relation Age of Onset    Other Mother         copd       Vitals:  /72   Pulse 83   Temp 98.2 °F (36.8 °C)   Resp 16   Ht 5' 4\" (1.626 m)   Wt 132 lb (59.9 kg)   SpO2 95%   BMI 22.66 kg/m²   Temp (24hrs), Av.1 °F (36.7 °C), Min:97.5 °F (36.4 °C), Max:98.2 °F (36.8 °C)    No results for input(s): POCGLU in the last 72 hours. I/O (24Hr):     Intake/Output Summary (Last 24 hours) at 2022 1234  Last data filed at 2022 1209  Gross per 24 hour   Intake 2877.91 ml   Output 300 ml   Net 2577.91 ml       Labs:  Hematology:  Recent Labs     22  0526   WBC 9.2   RBC 4.46   HGB 13.3   HCT 41.7   MCV 93.5   MCH 29.8   MCHC 31.9   RDW 13.9      MPV 9.6     Chemistry:  Recent Labs     22  0526 22  0525    140   K 4.1 4.6    108*   CO2 23 24   GLUCOSE 97 132*   BUN 7 5*   CREATININE 0.58 0.61   MG  --  1.9   ANIONGAP 10 8*   LABGLOM >60 >60   GFRAA >60 >60   CALCIUM 8.8 8.3*   PHOS  --  2.8     Recent Labs     22  0525   PROT 5.5*   LABALBU 3. 6   AST 10   ALT 9   ALKPHOS 69   BILITOT 0.28*   AMYLASE 40   LIPASE 28     ABG:No results found for: POCPH, PHART, PH, POCPCO2, ZGV2DXN, PCO2, POCPO2, PO2ART, PO2, POCHCO3, QEW8ROE, HCO3, NBEA, PBEA, BEART, BE, THGBART, THB, HOC9LSO, RZII6QQX, O4YPVPFD, O2SAT, FIO2  No results found for: SPECIAL  No results found for: CULTURE    Radiology:  XR CHEST (2 VW)    Result Date: 6/23/2022  Foreign body projects in the inferior mid abdomen, which could be within the small bowel or transverse colon. XR ABDOMEN (KUB) (SINGLE AP VIEW)    Result Date: 2/80/6844  Metallic foreign body projecting in the right lower quadrant could be within the ascending colon. XR ABDOMEN (KUB) (SINGLE AP VIEW)    Result Date: 6/23/2022  Foreign body projects in the inferior mid abdomen, which could be within the small bowel or transverse colon. Physical Examination:        General appearance:  alert, cooperative and no distress  Mental Status:  oriented to person, place and time and normal affect  Lungs:  clear to auscultation bilaterally, normal effort  Heart:  regular rate and rhythm, no murmur  Abdomen:  soft, nontender, nondistended, normal bowel sounds, no masses, hepatomegaly, splenomegaly  Extremities:  no edema, redness, tenderness in the calves  Skin:  no gross lesions, rashes, induration    Assessment:        Hospital Problems           Last Modified POA    * (Principal) Swallowed foreign body 6/23/2022 Yes          Plan:        #Foreign body ingestion  -Viewed both GI and general surgery notes.  No surgical intervention at this time. Ja Oconnor may resume normal diet.  Object will hopefully pass on its own.  Continue to monitor. Repeat KUB on Monday 6/26 if object has not passed.  Return to ED if significant abdominal pain develops    José Mustafa  6/24/2022  12:34 PM

## 2022-06-24 NOTE — DISCHARGE INSTR - DIET

## 2022-06-27 ENCOUNTER — HOSPITAL ENCOUNTER (OUTPATIENT)
Age: 42
Discharge: HOME | End: 2022-06-29
Payer: COMMERCIAL

## 2022-06-27 ENCOUNTER — HOSPITAL ENCOUNTER (OUTPATIENT)
Dept: GENERAL RADIOLOGY | Age: 42
Discharge: HOME | End: 2022-06-29
Payer: COMMERCIAL

## 2022-06-27 DIAGNOSIS — T18.9XXA SWALLOWED FOREIGN BODY, INITIAL ENCOUNTER: ICD-10-CM

## 2022-06-27 DIAGNOSIS — T18.4XXA FOREIGN BODY IN COLON, INITIAL ENCOUNTER: Primary | ICD-10-CM

## 2022-06-27 PROCEDURE — 74018 RADEX ABDOMEN 1 VIEW: CPT

## 2022-06-30 ENCOUNTER — HOSPITAL ENCOUNTER (OUTPATIENT)
Age: 42
Discharge: HOME | End: 2022-07-02
Payer: COMMERCIAL

## 2022-06-30 ENCOUNTER — HOSPITAL ENCOUNTER (OUTPATIENT)
Dept: GENERAL RADIOLOGY | Age: 42
Discharge: HOME | End: 2022-07-02
Payer: COMMERCIAL

## 2022-06-30 DIAGNOSIS — T18.4XXA FOREIGN BODY IN COLON, INITIAL ENCOUNTER: ICD-10-CM

## 2022-06-30 PROCEDURE — 74018 RADEX ABDOMEN 1 VIEW: CPT

## 2022-11-15 ENCOUNTER — OFFICE VISIT (OUTPATIENT)
Dept: FAMILY MEDICINE CLINIC | Age: 42
End: 2022-11-15
Payer: COMMERCIAL

## 2022-11-15 ENCOUNTER — NURSE ONLY (OUTPATIENT)
Dept: PRIMARY CARE CLINIC | Age: 42
End: 2022-11-15

## 2022-11-15 VITALS
HEART RATE: 90 BPM | RESPIRATION RATE: 18 BRPM | HEIGHT: 64 IN | DIASTOLIC BLOOD PRESSURE: 74 MMHG | TEMPERATURE: 97.7 F | BODY MASS INDEX: 23.05 KG/M2 | SYSTOLIC BLOOD PRESSURE: 116 MMHG | OXYGEN SATURATION: 97 % | WEIGHT: 135 LBS

## 2022-11-15 DIAGNOSIS — Z56.6 STRESS AT WORK: ICD-10-CM

## 2022-11-15 DIAGNOSIS — Z23 NEED FOR INFLUENZA VACCINATION: ICD-10-CM

## 2022-11-15 DIAGNOSIS — M79.672 LEFT FOOT PAIN: Primary | ICD-10-CM

## 2022-11-15 DIAGNOSIS — F43.22 ACUTE ADJUSTMENT DISORDER WITH ANXIETY: ICD-10-CM

## 2022-11-15 PROCEDURE — 90471 IMMUNIZATION ADMIN: CPT | Performed by: NURSE PRACTITIONER

## 2022-11-15 PROCEDURE — 99213 OFFICE O/P EST LOW 20 MIN: CPT | Performed by: NURSE PRACTITIONER

## 2022-11-15 PROCEDURE — 90674 CCIIV4 VAC NO PRSV 0.5 ML IM: CPT | Performed by: NURSE PRACTITIONER

## 2022-11-15 RX ORDER — ESCITALOPRAM OXALATE 10 MG/1
10 TABLET ORAL DAILY
Qty: 30 TABLET | Refills: 3 | Status: SHIPPED | OUTPATIENT
Start: 2022-11-15

## 2022-11-15 SDOH — ECONOMIC STABILITY: FOOD INSECURITY: WITHIN THE PAST 12 MONTHS, THE FOOD YOU BOUGHT JUST DIDN'T LAST AND YOU DIDN'T HAVE MONEY TO GET MORE.: NEVER TRUE

## 2022-11-15 SDOH — ECONOMIC STABILITY: FOOD INSECURITY: WITHIN THE PAST 12 MONTHS, YOU WORRIED THAT YOUR FOOD WOULD RUN OUT BEFORE YOU GOT MONEY TO BUY MORE.: NEVER TRUE

## 2022-11-15 SDOH — HEALTH STABILITY - MENTAL HEALTH: OTHER PHYSICAL AND MENTAL STRAIN RELATED TO WORK: Z56.6

## 2022-11-15 ASSESSMENT — ENCOUNTER SYMPTOMS
SHORTNESS OF BREATH: 0
SINUS PAIN: 0
COUGH: 0
ABDOMINAL PAIN: 0
SORE THROAT: 0
VOMITING: 0
NAUSEA: 0
DIARRHEA: 0

## 2022-11-15 ASSESSMENT — PATIENT HEALTH QUESTIONNAIRE - PHQ9
SUM OF ALL RESPONSES TO PHQ QUESTIONS 1-9: 0
1. LITTLE INTEREST OR PLEASURE IN DOING THINGS: 0
SUM OF ALL RESPONSES TO PHQ9 QUESTIONS 1 & 2: 0
SUM OF ALL RESPONSES TO PHQ QUESTIONS 1-9: 0
2. FEELING DOWN, DEPRESSED OR HOPELESS: 0

## 2022-11-15 ASSESSMENT — SOCIAL DETERMINANTS OF HEALTH (SDOH): HOW HARD IS IT FOR YOU TO PAY FOR THE VERY BASICS LIKE FOOD, HOUSING, MEDICAL CARE, AND HEATING?: NOT HARD AT ALL

## 2022-11-15 NOTE — PROGRESS NOTES
[address] [facility]  [address]  [facility] [address]  Dept: [phone]  Dept Fax: [phone]    Vlad Francis is a 43 y.o. female who presents today for her medicalconditions/complaints as noted below. Vlad Francis is c/o of Foot Pain (Left foot. Patient stepped on something in July and is still having issue.) and Anxiety      HPI:       15-year-old female patient presents for left foot pain and anxiety. Patient describes that she was walking barefoot in July. Reportedly felt a sharp sudden pain at that time. Subsequently has had ongoing pain. Does have a callus formation to the left ball of her midfoot. There is no redness warmth or swelling. Previously saw a podiatrist regarding a heel spur. History of anxiety. Patient reports she has previously had treatment however was stable up until recently. Describes that at work there is a toxic stressful situation with would specific employee. Reports constant stress, paranoia, fear. Reports this is led to difficulty sleeping and affecting her day-to-day mood. Past Medical History:   Diagnosis Date    Shingles     age 21        Current Outpatient Medications   Medication Sig Dispense Refill    escitalopram (LEXAPRO) 10 MG tablet Take 1 tablet by mouth daily 30 tablet 3     No current facility-administered medications for this visit. No Known Allergies    Subjective:      Review of Systems   Constitutional:  Negative for chills and fever. HENT:  Negative for ear pain, sinus pain and sore throat. Respiratory:  Negative for cough and shortness of breath. Cardiovascular:  Negative for chest pain and palpitations. Gastrointestinal:  Negative for abdominal pain, diarrhea, nausea and vomiting. Musculoskeletal:  Positive for arthralgias. Neurological:  Negative for dizziness and headaches. Psychiatric/Behavioral:  The patient is nervous/anxious.     All other systems reviewed and are negative.    :Objective     Physical Exam  Vitals and nursing note reviewed. Constitutional:       Appearance: Normal appearance. Cardiovascular:      Rate and Rhythm: Normal rate. Pulmonary:      Effort: Pulmonary effort is normal.      Breath sounds: Normal breath sounds. Musculoskeletal:        Feet:    Skin:     General: Skin is warm and dry. Neurological:      General: No focal deficit present. Mental Status: She is alert and oriented to person, place, and time.      /74 (Site: Left Upper Arm, Position: Sitting, Cuff Size: Medium Adult)   Pulse 90   Temp 97.7 °F (36.5 °C) (Tympanic)   Resp 18   Ht 5' 4\" (1.626 m)   Wt 135 lb (61.2 kg)   SpO2 97%   BMI 23.17 kg/m²     Lab Review   Admission on 06/23/2022, Discharged on 06/24/2022   Component Date Value    Glucose 06/23/2022 97     BUN 06/23/2022 7     Creatinine 06/23/2022 0.58     Bun/Cre Ratio 06/23/2022 12     Calcium 06/23/2022 8.8     Sodium 06/23/2022 138     Potassium 06/23/2022 4.1     Chloride 06/23/2022 105     CO2 06/23/2022 23     Anion Gap 06/23/2022 10     GFR Non- 06/23/2022 >60     GFR  06/23/2022 >60     GFR Comment 06/23/2022          WBC 06/23/2022 9.2     RBC 06/23/2022 4.46     Hemoglobin 06/23/2022 13.3     Hematocrit 06/23/2022 41.7     MCV 06/23/2022 93.5     MCH 06/23/2022 29.8     MCHC 06/23/2022 31.9     RDW 06/23/2022 13.9     Platelets 93/47/2489 199     MPV 06/23/2022 9.6     NRBC Automated 06/23/2022 0.0     Seg Neutrophils 06/23/2022 54     Lymphocytes 06/23/2022 38     Monocytes 06/23/2022 5     Eosinophils % 06/23/2022 3     Basophils 06/23/2022 0     Immature Granulocytes 06/23/2022 0     Segs Absolute 06/23/2022 4.96     Absolute Lymph # 06/23/2022 3.50     Absolute Mono # 06/23/2022 0.46     Absolute Eos # 06/23/2022 0.28     Basophils Absolute 06/23/2022 0.00     Absolute Immature Granul* 06/23/2022 0.00     Glucose 06/24/2022 132 (A)     BUN 06/24/2022 5 (A) Creatinine 06/24/2022 0.61     Bun/Cre Ratio 06/24/2022 8 (A)     Calcium 06/24/2022 8.3 (A)     Sodium 06/24/2022 140     Potassium 06/24/2022 4.6     Chloride 06/24/2022 108 (A)     CO2 06/24/2022 24     Anion Gap 06/24/2022 8 (A)     Alkaline Phosphatase 06/24/2022 69     ALT 06/24/2022 9     AST 06/24/2022 10     Total Bilirubin 06/24/2022 0.28 (A)     Total Protein 06/24/2022 5.5 (A)     Albumin 06/24/2022 3.6     GFR Non- 06/24/2022 >60     GFR  06/24/2022 >60     GFR Comment 06/24/2022          Magnesium 06/24/2022 1.9     Phosphorus 06/24/2022 2.8     Amylase 06/24/2022 40     Lipase 06/24/2022 28        Assessment and Plan      1. Left foot pain  -     XR FOOT LEFT (MIN 3 VIEWS); Future  -     [facility] Benigno Bell DPM, Podiatry, [place]  2. Need for influenza vaccination  -     Influenza, FLUCELVAX, (age 10 mo+), IM, Preservative Free, 0.5 mL  3. Stress at work  -     [address] [place] [address])  -     escitalopram (LEXAPRO) 10 MG tablet; Take 1 tablet by mouth daily, Disp-30 tablet, R-3Normal  4. Acute adjustment disorder with anxiety  -     [address] [place] [address])  -     escitalopram (LEXAPRO) 10 MG tablet; Take 1 tablet by mouth daily, Disp-30 tablet, R-3Normal     Regarding foot pain recommend x-ray of the left foot followed by podiatry follow-up regarding possible intervention    Regarding anxiety recommend daily Lexapro, referral for counseling            Immunizations Administered       Name Date Dose Route    Influenza, FLUCELVAX, (age 10 mo+), MDCK, PF, 0.5mL 11/15/2022 0.5 mL Intramuscular    Site: Deltoid- Left    Lot: [id number]    NDC: 26423-481-04            No results found for this visit on 11/15/22. Return if symptoms worsen or fail to improve.         Orders Placed This Encounter   Medications    escitalopram (LEXAPRO) 10 MG tablet     Sig: Take 1 tablet by mouth daily     Dispense:  30 tablet     Refill:  3 Patient given educational materials - see patient instructions. Discussed use, benefit, and side effects of prescribed medications. All patientquestions answered. Pt voiced understanding. Patient given educational materials - see patient instructions. Discussed use, benefit, and side effects of prescribed medications. All patientquestions answered. Pt voiced understanding. This note was transcribed using dictation with Dragon services. Efforts were made to correct any errors but some words may be misinterpreted.     Patient assumes risks associated with failure to complete recommended testing and treatments in a timely manner    Electronically signed by KHLOE Gordon CNP on 11/15/2022at 10:57 AM

## 2022-11-15 NOTE — PATIENT INSTRUCTIONS
Patient Education        Anxiety Disorder: Care Instructions  Your Care Instructions     Anxiety is a normal reaction to stress. Difficult situations can cause you to have symptoms such as sweaty palms and a nervous feeling. In an anxiety disorder, the symptoms are far more severe. Constant worry, muscle tension, trouble sleeping, nausea and diarrhea, and other symptoms can make normal daily activities difficult or impossible. These symptoms may occur for no reason, and they can affect your work, school, or social life. Medicines, counseling, and self-care can all help. Follow-up care is a key part of your treatment and safety. Be sure to make and go to all appointments, and call your doctor if you are having problems. It's also a good idea to know your test results and keep a list of the medicines you take. How can you care for yourself at home? Take medicines exactly as directed. Call your doctor if you think you are having a problem with your medicine. Go to your counseling sessions and follow-up appointments. Recognize and accept your anxiety. Then, when you are in a situation that makes you anxious, say to yourself, \"This is not an emergency. I feel uncomfortable, but I am not in danger. I can keep going even if I feel anxious. \"  Be kind to your body:  Relieve tension with exercise or a massage. Get enough rest.  Avoid alcohol, caffeine, nicotine, and illegal drugs. They can increase your anxiety level and cause sleep problems. Learn and do relaxation techniques. See below for more about these techniques. Engage your mind. Get out and do something you enjoy. Go to a funny movie, or take a walk or hike. Plan your day. Having too much or too little to do can make you anxious. Keep a record of your symptoms. Discuss your fears with a good friend or family member, or join a support group for people with similar problems. Talking to others sometimes relieves stress.   Get involved in social groups, or volunteer to help others. Being alone sometimes makes things seem worse than they are. Get at least 30 minutes of exercise on most days of the week to relieve stress. Walking is a good choice. You also may want to do other activities, such as running, swimming, cycling, or playing tennis or team sports. Relaxation techniques  Do relaxation exercises 10 to 20 minutes a day. You can play soothing, relaxing music while you do them, if you wish. Tell others in your house that you are going to do your relaxation exercises. Ask them not to disturb you. Find a comfortable place, away from all distractions and noise. Lie down on your back, or sit with your back straight. Focus on your breathing. Make it slow and steady. Breathe in through your nose. Breathe out through either your nose or mouth. Breathe deeply, filling up the area between your navel and your rib cage. Breathe so that your belly goes up and down. Do not hold your breath. Breathe like this for 5 to 10 minutes. Notice the feeling of calmness throughout your whole body. As you continue to breathe slowly and deeply, relax by doing the following for another 5 to 10 minutes:  Tighten and relax each muscle group in your body. You can begin at your toes and work your way up to your head. Imagine your muscle groups relaxing and becoming heavy. Empty your mind of all thoughts. Let yourself relax more and more deeply. Become aware of the state of calmness that surrounds you. When your relaxation time is over, you can bring yourself back to alertness by moving your fingers and toes and then your hands and feet and then stretching and moving your entire body. Sometimes people fall asleep during relaxation, but they usually wake up shortly afterward. Always give yourself time to return to full alertness before you drive a car or do anything that might cause an accident if you are not fully alert. Never play a relaxation tape while you drive a car.   When should you call for help? Call 911 anytime you think you may need emergency care. For example, call if:    You feel you cannot stop from hurting yourself or someone else. Keep the numbers for these national suicide hotlines: 2-889-895-TALK (9-362.985.9814) and 4-242-LRZGHRT (1-267.372.8500). If you or someone you know talks about suicide or feeling hopeless, get help right away. Watch closely for changes in your health, and be sure to contact your doctor if:    You have anxiety or fear that affects your life. You have symptoms of anxiety that are new or different from those you had before. Where can you learn more? Go to https://Aviate.ScanScout. org and sign in to your [employer] account. Enter P754 in the [employer] box to learn more about \"Anxiety Disorder: Care Instructions. \"     If you do not have an account, please click on the \"Sign Up Now\" link. Current as of: September 23, 2020               Content Version: 12.9  © 0005-3182 [employer]wise, Incorporated. Care instructions adapted under license by [facility] ([facility]). If you have questions about a medical condition or this instruction, always ask your healthcare professional. [address] any warranty or liability for your use of this information.

## 2022-11-23 ENCOUNTER — OFFICE VISIT (OUTPATIENT)
Dept: PODIATRY | Age: 42
End: 2022-11-23
Payer: COMMERCIAL

## 2022-11-23 VITALS — WEIGHT: 135 LBS | HEIGHT: 64 IN | BODY MASS INDEX: 23.05 KG/M2

## 2022-11-23 DIAGNOSIS — S93.602A FOOT SPRAIN, LEFT, INITIAL ENCOUNTER: Primary | ICD-10-CM

## 2022-11-23 DIAGNOSIS — M77.42 METATARSALGIA, LEFT FOOT: ICD-10-CM

## 2022-11-23 PROCEDURE — 99203 OFFICE O/P NEW LOW 30 MIN: CPT | Performed by: PODIATRIST

## 2022-11-23 NOTE — PROGRESS NOTES
[address]  [address] [facility] [address]  Dept: [phone]  Dept Fax: [phone]    NEW PATIENT PROGRESS NOTE  Date of patient's visit: 11/23/2022  Patient's Name:  Priya Caal YOB: 1980            Patient Care Team:  KHLOE Kirkland CNP as PCP - General (Family Nurse Practitioner)  KHLOE Kirkland CNP as PCP - [facility] Empaneled Provider  Maci Garcia DPM as Physician (Podiatry)        Chief Complaint   Patient presents with    New Patient     To establish care    Foot Pain     Left foot pain x4 months         HPI:   Priya Caal is a 43 y.o. female who presents to the office today complaining of left foot pain. Symptoms began 4 month(s) ago. Patient relates pain is Present. Pain is rated 3 out of 10 and is described as intermittent, mild, moderate, severe. Treatments prior to today's visit include: none. She denies any trauma. Currently denies F/C/N/V. Pt's primary care physician is KHLOE Kirkland CNP last seen  11/15/2022    No Known Allergies    Past Medical History:   Diagnosis Date    Shingles     age 21       Prior to Admission medications    Medication Sig Start Date End Date Taking?  Authorizing Provider   escitalopram (LEXAPRO) 10 MG tablet Take 1 tablet by mouth daily 11/15/22  Yes KHLOE Toussaint CNP       Past Surgical History:   Procedure Laterality Date    ENDOMETRIAL ABLATION  2020    HAND TENDON SURGERY      HERNIA REPAIR      x3    KIDNEY STONE SURGERY      POLYPECTOMY      colon 2015       Family History   Problem Relation Age of Onset    Other Mother         copd       Social History     Tobacco Use    Smoking status: Every Day     Packs/day: 1.00     Types: Cigarettes    Smokeless tobacco: Never   Substance Use Topics    Alcohol use: Yes     Comment: occasional       Review of Systems    Review of Systems:   History obtained from chart review and the patient  General ROS: negative for - chills, fatigue, fever, night sweats or weight gain  Constitutional: Negative for chills, diaphoresis, fatigue, fever and unexpected weight change. Musculoskeletal: Positive for arthralgias, gait problem and joint swelling. Neurological ROS: negative for - behavioral changes, confusion, headaches or seizures. Negative for weakness and numbness. Dermatological ROS: negative for - mole changes, rash  Cardiovascular: Negative for leg swelling. Gastrointestinal: Negative for constipation, diarrhea, nausea and vomiting. Lower Extremity Physical Examination:   Vitals: There were no vitals filed for this visit. General: AAO x 3 in NAD. Dermatologic Exam:  Skin lesion/ulceration Absent . Skin No rashes or nodules noted. .       Musculoskeletal:     1st MPJ ROM decreased, Bilateral.  Muscle strength 5/5, Bilateral.  Pain present upon palpation of left foot along metatarsal head, 2nd. Medial longitudinal arch, Bilateral WNL. Ankle ROM WNL,Bilateral.    Dorsally contracted digits absent digits 1-5 Bilateral.     Vascular: DP and PT pulses palpable 2/4, Bilateral.  CFT <3 seconds, Bilateral.  Hair growth present to the level of the digits, Bilateral.  Edema absent, Bilateral.  Varicosities absent, Bilateral. Erythema absent, Bilateral    Neurological: Sensation intact to light touch to level of digits, Bilateral.  Protective sensation intact 10/10 sites via 5.07/10g [place]-Messi Monofilament, Bilateral.  negative Tinel's, Bilateral.  negative Valleix sign, Bilateral.      Integument: Warm, dry, supple, Bilateral.  Open lesion absent, Bilateral.  Interdigital maceration absent to web spaces 1-4, Bilateral.  Nails are normal in length, thickness and color 1-5 bilateral.  Fissures absent, Bilateral.       Asessment: Patient is a 43 y.o. female with:    Diagnosis Orders   1. Foot sprain, left, initial encounter        2.  Metatarsalgia, left foot              Plan: Patient examined and evaluated. Current condition and treatment options discussed in detail. Discussed conservative and surgical options with the patient. Advised pt to avoid walking barefoot. Discussed importance of supportive shoes and inserts. All labs were reviewed and all imagining including the above findings were reviewed PRIOR to the patients arrival and with the patient today. Previous patient encounter was reviewed. Encounters from the patients other medical providers were reviewed and noted. Time was spent educating the patient and their families/caregivers on proper care of the feet and ankles. All the above diagnosis were addressed at todays visit and all questions were answered. A total of 30 minutes was spent with this patients encounter which included charting after the patients visit    . Verbal and written instructions given to patient. Contact office with any questions/problems/concerns. RTC in 2month(s).     11/23/2022    Electronically signed by Micah Starkey DPM on 11/23/2022 at 8:14 AM  11/23/2022

## 2022-12-07 ENCOUNTER — OFFICE VISIT (OUTPATIENT)
Dept: PODIATRY | Age: 42
End: 2022-12-07
Payer: COMMERCIAL

## 2022-12-07 VITALS — WEIGHT: 135 LBS | BODY MASS INDEX: 23.05 KG/M2 | HEIGHT: 64 IN

## 2022-12-07 DIAGNOSIS — M79.605 PAIN IN BOTH LOWER EXTREMITIES: ICD-10-CM

## 2022-12-07 DIAGNOSIS — M79.604 PAIN IN BOTH LOWER EXTREMITIES: ICD-10-CM

## 2022-12-07 DIAGNOSIS — D23.72 BENIGN NEOPLASM OF SKIN OF LEFT FOOT: Primary | ICD-10-CM

## 2022-12-07 PROCEDURE — 99213 OFFICE O/P EST LOW 20 MIN: CPT | Performed by: PODIATRIST

## 2022-12-07 NOTE — PROGRESS NOTES
[address] [facility] PODIATRY [facility]  [address] [address]  Dept: [phone]  Dept Fax: [phone]    BENIGN NEOPLASM PROGRESS NOTE  Date of patient's visit: 12/7/2022  Patient's Name:  Mino Cook YOB: 1980            Patient Care Team:  KHLOE Rodriguez CNP as PCP - General (Family Nurse Practitioner)  KHLOE Rodriguez CNP as PCP - [facility] [place] [place] [facility]  Khadijah Gaitan DPM as Physician (Podiatry)              Chief Complaint   Patient presents with    Benign Neoplasm     Left foot         Mino Cook is a 43 y.o. female with the chief complaint of painful lesions on her   feet. . They have been present for {NUMBERS0-6:91613} {TIME FRAME:21287} duration. The lesions are present on the {RIGHT LEFT BILATERAL:33132} foot. The patient has {NUMBERS 0-6:18266} lesion that is deep seated and has a painful core. Treatment has included***    Review of Systems    Review of Systems:  History obtained fromchart review and the patient  General ROS: negative for - chills, fatigue, fever, night sweats or weight gain  Constitutional: Negative for chills, diaphoresis, fatigue, fever and unexpected weight change. Musculoskeletal: Positive for arthralgias, gait problem and joint swelling. Neurological ROS: negative for -behavioral changes, confusion, headaches or seizures. Negative for weakness and numbness. Dermatological ROS: negative for - mole changes, rash  Cardiovascular: Negative for leg swelling. Gastrointestinal: Negative for constipation, diarrhea, nausea and vomiting. Lower extremity physical exam:  Dermatologic Exam:  Skin lesion present to the right and left plantar foot with a central core and petchaie noted to the lesions periphery.   Pain on palpation of the lesion        Musculoskeletal:     1st MPJ ROM decreased, Bilateral.  Muscle strength 5/5, Bilateral.  Pain present upon palpation of right and left plantar soft tissue lesions . Medial longitudinal arch, Bilateral WNL. Ankle ROM WNL,Bilateral.    Dorsally contracted digits absent digits 1-5 Bilateral.     Vascular: DP and PT pulses palpable 2/4, Bilateral.  CFT <3 seconds, Bilateral.  Hair growth present to the level of the digits, Bilateral.  Edema absent, Bilateral.  Varicosities absent, Bilateral. Erythema absent, Bilateral    Neurological: Sensation intact to light touch to level of digits, Bilateral.  Protective sensation intact 10/10 sites via 5.07/10g [place]-Messi Monofilament, Bilateral.  negative Tinel's, Bilateral.  negative Valleix sign, Bilateral.      Integument: Warm, dry, supple, Bilateral.  Open lesion absent, Bilateral.  Interdigital maceration absent to web spaces 1-4, Bilateral.  Nails are normal in length, thickness and color 1-5 bilateral.  Fissures absent, Bilateral.       Assessment:No diagnosis found. Plan:  The lesion was partially debrided and silver nitrate was applied with an apperature pad under occlusion. The patient will leave in place for 24-48 hours and than remove. The patient tolerated the procedure well and without complication.    Pt will follow up in {NUMBERS:59313} {days/wks/mos/yrs/PRN:418928}     Electronically signed by Electronically signed by Guerda Bertrand DPM on 12/7/2022 at 8:12 AM

## 2022-12-15 NOTE — PROGRESS NOTES
[address]  [address] [facility] [address]  Dept: [phone]  Dept Fax: [phone]    PATIENT PROGRESS NOTE  Date of patient's visit: 12/15/2022  Patient's Name:  Marquis Humphrey YOB: 1980            Patient Care Team:  KHLOE Herrera CNP as PCP - General (Family Nurse Practitioner)  KHLOE Herrera CNP as PCP - [facility]  Reuben Theodore DPM as Physician (Podiatry)        Chief Complaint   Patient presents with    Benign Neoplasm     Left foot         HPI:   Marquis Humphrey is a 43 y.o. female who presents to the office today complaining of left skin lesion pain. Symptoms began a couple month(s) ago. Patient relates pain is Present. Pain is rated 3 out of 10 and is described as intermittent, mild, moderate, severe. Treatments prior to today's visit include: none. Currently denies F/C/N/V. Pt's primary care physician is KHLOE Herrera CNP last seen  11/15/2022    No Known Allergies    Past Medical History:   Diagnosis Date    Shingles     age 21       Prior to Admission medications    Medication Sig Start Date End Date Taking?  Authorizing Provider   escitalopram (LEXAPRO) 10 MG tablet Take 1 tablet by mouth daily 11/15/22  Yes KHLOE Mccray CNP       Past Surgical History:   Procedure Laterality Date    ENDOMETRIAL ABLATION  2020    HAND TENDON SURGERY      HERNIA REPAIR      x3    KIDNEY STONE SURGERY      POLYPECTOMY      colon 2015       Family History   Problem Relation Age of Onset    Other Mother         copd       Social History     Tobacco Use    Smoking status: Every Day     Packs/day: 1.00     Types: Cigarettes    Smokeless tobacco: Never   Substance Use Topics    Alcohol use: Yes     Comment: occasional       Review of Systems    Review of Systems:   History obtained from chart review and the patient  General ROS: negative for - chills, fatigue, fever, night sweats or weight gain  Constitutional: Negative for chills, diaphoresis, fatigue, fever and unexpected weight change. Musculoskeletal: Positive for arthralgias, gait problem and joint swelling. Neurological ROS: negative for - behavioral changes, confusion, headaches or seizures. Negative for weakness and numbness. Dermatological ROS: negative for - mole changes, rash  Cardiovascular: Negative for leg swelling. Gastrointestinal: Negative for constipation, diarrhea, nausea and vomiting. Lower Extremity Physical Examination:   Vitals: There were no vitals filed for this visit. General: AAO x 3 in NAD. Dermatologic Exam:  Soft tissue lesion to the plantar left foot with central core and petechiae. Pain on palpation of lesion. Musculoskeletal:     1st MPJ ROM decreased, Bilateral.  Muscle strength 5/5, Bilateral.  Pain present upon palpation of left foot along metatarsal head, 2nd. Medial longitudinal arch, Bilateral WNL. Ankle ROM WNL,Bilateral.    Dorsally contracted digits absent digits 1-5 Bilateral.     Vascular: DP and PT pulses palpable 2/4, Bilateral.  CFT <3 seconds, Bilateral.  Hair growth present to the level of the digits, Bilateral.  Edema absent, Bilateral.  Varicosities absent, Bilateral. Erythema absent, Bilateral    Neurological: Sensation intact to light touch to level of digits, Bilateral.  Protective sensation intact 10/10 sites via 5.07/10g [place]-Messi Monofilament, Bilateral.  negative Tinel's, Bilateral.  negative Valleix sign, Bilateral.      Integument: Warm, dry, supple, Bilateral.  Open lesion absent, Bilateral.  Interdigital maceration absent to web spaces 1-4, Bilateral.  Nails are normal in length, thickness and color 1-5 bilateral.  Fissures absent, Bilateral.       Asessment: Patient is a 43 y.o. female with:      Diagnosis Orders   1. Benign neoplasm of skin of left foot        2.  Pain in both lower extremities              Plan: Patient examined and evaluated. Current condition and treatment options discussed in detail. Discussed conservative and surgical options with the patient. Advised pt to avoid walking barefoot. The lesions were partially excised via 15 blade and silver nitrate was applied under occlusion. The patient tolerated the procedure well and without complication. Advised patient to use vasoline to the area after tomorrow to prevent surrounding tissue irritation. All labs were reviewed and all imagining including the above findings were reviewed PRIOR to the patients arrival and with the patient today. Previous patient encounter was reviewed. Encounters from the patients other medical providers were reviewed and noted. Time was spent educating the patient on proper care of the feet and ankles. All the above diagnosis were addressed at todays visit and all questions were answered. A total of 20 minutes was spent with this patients encounter which included charting after the patients visit    . Verbal and written instructions given to patient. Contact office with any questions/problems/concerns. RTC in 2month(s).     12/7/2022    Electronically signed by Melisa Salomon DPM on 12/15/2022 at 4:13 PM  12/7/2022

## 2024-02-09 ENCOUNTER — OFFICE VISIT (OUTPATIENT)
Dept: PRIMARY CARE CLINIC | Age: 44
End: 2024-02-09

## 2024-02-09 VITALS
BODY MASS INDEX: 24.75 KG/M2 | OXYGEN SATURATION: 96 % | WEIGHT: 145 LBS | HEIGHT: 64 IN | SYSTOLIC BLOOD PRESSURE: 114 MMHG | TEMPERATURE: 98.2 F | DIASTOLIC BLOOD PRESSURE: 76 MMHG | HEART RATE: 82 BPM

## 2024-02-09 DIAGNOSIS — L03.012 PARONYCHIA OF LEFT MIDDLE FINGER: Primary | ICD-10-CM

## 2024-02-09 RX ORDER — FLUCONAZOLE 150 MG/1
150 TABLET ORAL
Qty: 2 TABLET | Refills: 0 | Status: SHIPPED | OUTPATIENT
Start: 2024-02-09 | End: 2024-02-15

## 2024-02-09 RX ORDER — AMOXICILLIN AND CLAVULANATE POTASSIUM 875; 125 MG/1; MG/1
1 TABLET, FILM COATED ORAL 2 TIMES DAILY
Qty: 14 TABLET | Refills: 0 | Status: SHIPPED | OUTPATIENT
Start: 2024-02-09 | End: 2024-02-16

## 2024-02-09 ASSESSMENT — ENCOUNTER SYMPTOMS
CHEST TIGHTNESS: 0
COUGH: 0
WHEEZING: 0
SHORTNESS OF BREATH: 0
RESPIRATORY NEGATIVE: 1

## 2024-02-09 NOTE — PROGRESS NOTES
(BACTROBAN) 2 % ointment     Sig: Apply topically 3 times daily.     Dispense:  15 g     Refill:  0    fluconazole (DIFLUCAN) 150 MG tablet     Sig: Take 1 tablet by mouth every 72 hours for 6 days     Dispense:  2 tablet     Refill:  0        Patient given educational materials - see patient instructions.Discussed use, benefit, and side effects of prescribed medications.  All patientquestions answered.  Pt voiced understanding.    Electronically signed by KHLOE Snyder CNP on 2/9/2024at 5:07 PM

## 2024-07-07 ENCOUNTER — OFFICE VISIT (OUTPATIENT)
Dept: PRIMARY CARE CLINIC | Age: 44
End: 2024-07-07
Payer: COMMERCIAL

## 2024-07-07 VITALS
OXYGEN SATURATION: 97 % | DIASTOLIC BLOOD PRESSURE: 78 MMHG | HEART RATE: 95 BPM | BODY MASS INDEX: 24.89 KG/M2 | TEMPERATURE: 98.4 F | WEIGHT: 145 LBS | SYSTOLIC BLOOD PRESSURE: 114 MMHG

## 2024-07-07 DIAGNOSIS — J02.9 SORE THROAT: ICD-10-CM

## 2024-07-07 DIAGNOSIS — B37.9 ANTIBIOTIC-INDUCED YEAST INFECTION: ICD-10-CM

## 2024-07-07 DIAGNOSIS — J02.0 ACUTE STREPTOCOCCAL PHARYNGITIS: Primary | ICD-10-CM

## 2024-07-07 DIAGNOSIS — T36.95XA ANTIBIOTIC-INDUCED YEAST INFECTION: ICD-10-CM

## 2024-07-07 LAB — S PYO AG THROAT QL: POSITIVE

## 2024-07-07 PROCEDURE — 99213 OFFICE O/P EST LOW 20 MIN: CPT | Performed by: NURSE PRACTITIONER

## 2024-07-07 PROCEDURE — 87880 STREP A ASSAY W/OPTIC: CPT | Performed by: NURSE PRACTITIONER

## 2024-07-07 RX ORDER — FLUCONAZOLE 150 MG/1
150 TABLET ORAL
Qty: 2 TABLET | Refills: 0 | Status: SHIPPED | OUTPATIENT
Start: 2024-07-07 | End: 2024-07-13

## 2024-07-07 RX ORDER — AMOXICILLIN 500 MG/1
500 CAPSULE ORAL 3 TIMES DAILY
Qty: 30 CAPSULE | Refills: 0 | Status: SHIPPED | OUTPATIENT
Start: 2024-07-07 | End: 2024-07-17

## 2024-07-07 SDOH — ECONOMIC STABILITY: FOOD INSECURITY: WITHIN THE PAST 12 MONTHS, YOU WORRIED THAT YOUR FOOD WOULD RUN OUT BEFORE YOU GOT MONEY TO BUY MORE.: NEVER TRUE

## 2024-07-07 SDOH — ECONOMIC STABILITY: FOOD INSECURITY: WITHIN THE PAST 12 MONTHS, THE FOOD YOU BOUGHT JUST DIDN'T LAST AND YOU DIDN'T HAVE MONEY TO GET MORE.: NEVER TRUE

## 2024-07-07 SDOH — ECONOMIC STABILITY: HOUSING INSECURITY
IN THE LAST 12 MONTHS, WAS THERE A TIME WHEN YOU DID NOT HAVE A STEADY PLACE TO SLEEP OR SLEPT IN A SHELTER (INCLUDING NOW)?: NO

## 2024-07-07 SDOH — ECONOMIC STABILITY: INCOME INSECURITY: HOW HARD IS IT FOR YOU TO PAY FOR THE VERY BASICS LIKE FOOD, HOUSING, MEDICAL CARE, AND HEATING?: NOT HARD AT ALL

## 2024-07-07 ASSESSMENT — PATIENT HEALTH QUESTIONNAIRE - PHQ9
SUM OF ALL RESPONSES TO PHQ QUESTIONS 1-9: 0
SUM OF ALL RESPONSES TO PHQ QUESTIONS 1-9: 0
SUM OF ALL RESPONSES TO PHQ9 QUESTIONS 1 & 2: 0
2. FEELING DOWN, DEPRESSED OR HOPELESS: NOT AT ALL
1. LITTLE INTEREST OR PLEASURE IN DOING THINGS: NOT AT ALL
SUM OF ALL RESPONSES TO PHQ QUESTIONS 1-9: 0
SUM OF ALL RESPONSES TO PHQ QUESTIONS 1-9: 0

## 2025-03-10 ENCOUNTER — OFFICE VISIT (OUTPATIENT)
Dept: PRIMARY CARE CLINIC | Age: 45
End: 2025-03-10
Payer: COMMERCIAL

## 2025-03-10 VITALS
BODY MASS INDEX: 24.75 KG/M2 | SYSTOLIC BLOOD PRESSURE: 111 MMHG | HEIGHT: 64 IN | WEIGHT: 145 LBS | OXYGEN SATURATION: 99 % | HEART RATE: 75 BPM | DIASTOLIC BLOOD PRESSURE: 77 MMHG

## 2025-03-10 DIAGNOSIS — T36.95XA ANTIBIOTIC-INDUCED YEAST INFECTION: ICD-10-CM

## 2025-03-10 DIAGNOSIS — B37.9 ANTIBIOTIC-INDUCED YEAST INFECTION: ICD-10-CM

## 2025-03-10 DIAGNOSIS — L02.11 ABSCESS OF SKIN OF NECK: Primary | ICD-10-CM

## 2025-03-10 PROCEDURE — 99213 OFFICE O/P EST LOW 20 MIN: CPT | Performed by: NURSE PRACTITIONER

## 2025-03-10 RX ORDER — FLUCONAZOLE 150 MG/1
150 TABLET ORAL
Qty: 2 TABLET | Refills: 0 | Status: SHIPPED | OUTPATIENT
Start: 2025-03-10 | End: 2025-03-16

## 2025-03-10 RX ORDER — DOXYCYCLINE 100 MG/1
100 CAPSULE ORAL 2 TIMES DAILY
Qty: 14 CAPSULE | Refills: 0 | Status: SHIPPED | OUTPATIENT
Start: 2025-03-10 | End: 2025-03-17

## 2025-03-10 ASSESSMENT — ENCOUNTER SYMPTOMS
SHORTNESS OF BREATH: 0
CHEST TIGHTNESS: 0
RESPIRATORY NEGATIVE: 1
COUGH: 0
WHEEZING: 0

## 2025-03-10 NOTE — PROGRESS NOTES
reviewed and are negative.      :     Physical Exam  Vitals and nursing note reviewed.   Constitutional:       General: She is not in acute distress.     Appearance: She is well-developed. She is not diaphoretic.   HENT:      Head: Normocephalic and atraumatic.   Cardiovascular:      Rate and Rhythm: Normal rate and regular rhythm.      Heart sounds: Normal heart sounds. No murmur heard.  Pulmonary:      Effort: Pulmonary effort is normal. No respiratory distress.      Breath sounds: Normal breath sounds. No wheezing.   Skin:     General: Skin is warm and dry.      Findings: Abscess (indurated abscess noted to the left anterior neck with surrounding erythema) present.   Neurological:      Mental Status: She is alert.       /77   Pulse 75   Ht 1.626 m (5' 4\")   Wt 65.8 kg (145 lb)   SpO2 99%   BMI 24.89 kg/m²     Assessment:   Assessment & Plan    Diagnosis Orders   1. Abscess of skin of neck  doxycycline hyclate (VIBRAMYCIN) 100 MG capsule      2. Antibiotic-induced yeast infection  fluconazole (DIFLUCAN) 150 MG tablet        Plan:      Due to the location of the abscess and the induration, I have opted not to perform I&D in the office.  Patient instructed to complete antibiotic prescription fully.  Warm compresses TID for 15 minutes at a time.  Tylenol/Motrin as needed for the discomfort/fever.  Patient agreeable to treatment plan.  Educational materials provided on AVS.  Follow up if symptoms do not improve/worsen.    Orders Placed This Encounter   Medications    doxycycline hyclate (VIBRAMYCIN) 100 MG capsule     Sig: Take 1 capsule by mouth 2 times daily for 7 days     Dispense:  14 capsule     Refill:  0    fluconazole (DIFLUCAN) 150 MG tablet     Sig: Take 1 tablet by mouth every 72 hours for 6 days     Dispense:  2 tablet     Refill:  0        Patient given educational materials - see patient instructions.Discussed use, benefit, and side effects of prescribed medications.  All patientquestions